# Patient Record
Sex: FEMALE | Race: WHITE | NOT HISPANIC OR LATINO | Employment: FULL TIME | URBAN - METROPOLITAN AREA
[De-identification: names, ages, dates, MRNs, and addresses within clinical notes are randomized per-mention and may not be internally consistent; named-entity substitution may affect disease eponyms.]

---

## 2017-06-20 ENCOUNTER — GENERIC CONVERSION - ENCOUNTER (OUTPATIENT)
Dept: OTHER | Facility: OTHER | Age: 37
End: 2017-06-20

## 2017-10-19 ENCOUNTER — GENERIC CONVERSION - ENCOUNTER (OUTPATIENT)
Dept: OTHER | Facility: OTHER | Age: 37
End: 2017-10-19

## 2017-10-27 ENCOUNTER — GENERIC CONVERSION - ENCOUNTER (OUTPATIENT)
Dept: OTHER | Facility: OTHER | Age: 37
End: 2017-10-27

## 2017-10-27 ENCOUNTER — ALLSCRIPTS OFFICE VISIT (OUTPATIENT)
Dept: PERINATAL CARE | Facility: MEDICAL CENTER | Age: 37
End: 2017-10-27
Payer: COMMERCIAL

## 2017-10-27 PROCEDURE — 76801 OB US < 14 WKS SINGLE FETUS: CPT | Performed by: OBSTETRICS & GYNECOLOGY

## 2017-11-15 ENCOUNTER — GENERIC CONVERSION - ENCOUNTER (OUTPATIENT)
Dept: OTHER | Facility: OTHER | Age: 37
End: 2017-11-15

## 2017-12-29 ENCOUNTER — ALLSCRIPTS OFFICE VISIT (OUTPATIENT)
Dept: PERINATAL CARE | Facility: MEDICAL CENTER | Age: 37
End: 2017-12-29
Payer: COMMERCIAL

## 2017-12-29 ENCOUNTER — GENERIC CONVERSION - ENCOUNTER (OUTPATIENT)
Dept: OTHER | Facility: OTHER | Age: 37
End: 2017-12-29

## 2017-12-29 PROCEDURE — 76811 OB US DETAILED SNGL FETUS: CPT | Performed by: OBSTETRICS & GYNECOLOGY

## 2017-12-29 PROCEDURE — 76817 TRANSVAGINAL US OBSTETRIC: CPT | Performed by: OBSTETRICS & GYNECOLOGY

## 2018-01-13 VITALS
WEIGHT: 137.01 LBS | BODY MASS INDEX: 25.21 KG/M2 | HEIGHT: 62 IN | SYSTOLIC BLOOD PRESSURE: 112 MMHG | DIASTOLIC BLOOD PRESSURE: 70 MMHG

## 2018-01-17 NOTE — PROGRESS NOTES
OCT 27 2017         RE: Natalie Rai                                 To: [facility]   MR#: [id number]                                  [facility]   : [facility], [address] [place] [address]   ENC: [id number]                             Fax: [phone]   (Exam #: [id number])      The LMP of this 40year old,  G2, P1-0-0-1 patient was AUG 12 2017, giving   her an BAR of MAY 19 2018 and a current gestational age of 9 weeks 6 days   by dates  A sonographic examination was performed on OCT 27 2017 using   real time equipment  The ultrasound examination was performed using   abdominal technique  The patient has a BMI of 25 1  Her blood pressure   today was 112/70  Earliest US on record:   [facility] US 10/27/17  10w6d 18  BAR      Mykel Call is on prenatal vitamins, Valtrex 500 mg daily Lotemax eyedrops 2   times a day and reports an allergy to penicillin  She denies any use of   cigarettes, alcohol or illicit drugs  Her past mental history is   significant for 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 currently treated with this   Lotemax and Valtrex  She also reports a history of asthma but only as a   child  She denies any prior surgical history  Her OB history includes a   prior 7 lbs  8 oz  baby born vaginally in  without complications  She   denies any first generation family history of hypertension, diabetes,   thrombosis or congenital anomalies  She is advanced maternal age in this   pregnancy and reports she is getting the pluriSelectn screen completed  She is   here today for a dating scan  Multiple longitudinal and transverse sections revealed a noyola   intrauterine pregnancy with the fetus in variable presentation  The   placenta is anterior in implantation, grade 0 in appearance        Cardiac motion was observed at 169 bpm       INDICATIONS      confirm gestational age   advanced maternal age      Exam Types      Level I      RESULTS      Fetus # 1 of 1 Variable presentation   Fetal growth appeared normal      MEASUREMENTS (* Included In Average GA)      CRL              4 2 cm        10 weeks 6 days*      THE AVERAGE GESTATIONAL AGE is 10 weeks 6 days +/- 7 days  ANATOMY COMMENTS      Anatomic detail is extremely limited at this gestational age  A discrete   fetal pole with cardiac motion was documented  Limb buds were documented   as well  The gestational sac is normal in appearance and located in the   fundus of the uterus  No gross abnormalities were noted on this   examination  Free fluid is not seen in the posterior cul-de-sac  There is   no suspicion of a subchorionic hematoma  ADNEXA      The left ovary appeared normal and measured 3 7 x 2 1 x 1 5 cm with a   volume of 6 1 cc  The right ovary appeared normal and measured 2 8 x 1 2 x   1 1 cm with a volume of 1 9 cc       AMNIOTIC FLUID         Largest Vertical Pocket = 4 0 cm   Amniotic Fluid: Normal      IMPRESSION      Ortiz IUP   10 weeks and 6 days by this ultrasound  (BAR=MAY 19 2018)   Variable presentation   Fetal growth appeared normal   Regular fetal heart rate of 169 bpm   Anterior placenta      [place]   Dear  Yell Neither      Thank you for requesting a  consultation on your patient Ms Ferguson for the following indications: AMA- Advanced Maternal Age  I spoke   to Nikolay Curry on the phone for her convenience  Risks:   1  AMA- with a 1 2 % risk for any type of aneuploidy at the time of an   amniocentesis  She understands that a Sequential Screen may miss 10% of   the babies with Downs syndrome or Trisomy 25 and that it is not a great   screen for other chromosome problems which her age puts her at risk for  Ultrasound is also not a good screen for aneuploidy as it may miss up to   50% of the babies with a chromosome problem   High risk patients such as   AMA patients, those with family hx of aneuploidy, an abnormal result on   the other genetic screens or US evidence for malformations have other   options such as invasive genetic screening on the placenta/amniotic fluid   or Cell free DNA screening completed on maternal blood  Cell free DNA   screening picks up 99% of babies with Downs and 97% of babies with T18  The false positive risk for Downs and T18 if found is 0 1%  This test also   can  7 of 11 babies with T13 with a false positive rate of 0 2%  This noninvasive test though does not screen for all types of aneuploidy   and needs to be confirmed by invasive genetic testing  Invasive genetic   testing includes an amniocentesis or CVS and yields the most information   on all of the chromosomes  Risks and benefits of both invasive procedures   were reviewed including an increased risk of loss of 0 5% over the   baseline risk  It was explained that normal chromosomes and a normal   ultrasound do not guarantee a normal baby nor a normal pregnancy outcome  Patient verbalized understanding and wanted to proceed with Cell free DNA   testing done locally  The patient was informed of the findings and counseled about the   limitations of the exam in detecting all forms of fetal congenital   abnormalities  She denies any vaginal bleeding or uterine cramping/contractions  Follow up recommended:   1  Fetal Level II ultrasound imaging is recommended at 19-20 weeks'   gestation  2  Recommend a 32 week growth scan due to the patients risk factor of   being advanced maternal age  3  I recommend a follow-up MSAFP at 16-18 weeks as a Cell free DNA   screening does not screen for neural tube defects  The later can be   ordered locally  4  I offered a return visit for nuchal translucency screening  She will   discuss with her provider,  and her insurance company  She was   given the diagnosis codes to make sure it's covered      CHRISSY Amador M D     Maternal-Fetal Medicine   Electronically signed 10/29/17 14:02

## 2018-01-23 VITALS
BODY MASS INDEX: 26.87 KG/M2 | DIASTOLIC BLOOD PRESSURE: 70 MMHG | HEART RATE: 86 BPM | WEIGHT: 146.04 LBS | HEIGHT: 62 IN | SYSTOLIC BLOOD PRESSURE: 111 MMHG

## 2018-01-23 NOTE — CONSULTS
I had the pleasure of evaluating your patient, Mk Brown  My full evaluation follows:      Chief Complaint  Here for ultrasound study      History of Present Illness  Please refer to the ultrasound report for additional information  Active Problems    1  Advanced maternal age in multigravida (80 56) (O1 46)   2  Need for Tdap vaccination (V06 1) (Z23)    Past Medical History    · History of Eye problems (V41 1) (H57 9)   · History of Herpes ocular (053 29) (B02 30)   · History of asthma (V12 69) (Z87 09)   · History of chicken pox (V12 09) (Z86 19)   · History of rectal fissure (V12 79) (Z87 19)   · History of scarlet fever (V12 09) (Z86 19)    Surgical History    · History of Cervical Loop Electrosurgical Excision (LEEP)   · History of Cervical Loop Electrosurgical Excision (LEEP)   · History of Cervix Cryosurgery   · History of Cryosurgery    Family History    · Family history of hyperlipidemia (V18 19) (Z83 49)    · Family history of cardiac disorder (V17 49) (Z82 49)   · Family history of diabetes mellitus (V18 0) (Z83 3)   · Family history of leukemia (V16 6) (Z80 6)    · Family history of asthma (V17 5) (Z82 5)   · Family history of chronic obstructive pulmonary disease (V17 6) (Z82 5)    · Family history of thyroid disease (V18 19) (Z83 49)    · Family history of hypertension (V17 49) (Z82 49)    · Family history of Colon carcinoma    · Family history of malignant neoplasm of female breast (V16 3) (Z80 3)    · Family history of epilepsy (V17 2) (Z82 0)    · Denied: Family history of mental disorder   · Denied: Family history of substance abuse    Social History    · Always uses seat belt   · Brushes teeth twice a day   · Dental care, regularly   · Exercise: Walking   · Full-time employment   ·    · Never a smoker   · Never a smoker   · No alcohol use   · No caffeine use   · No drug use   · Pets/Animals: Dog   · Recreation: Crafts    Current Meds   1  Fish Oil CAPS;    Therapy: (Recorded:[id number]) to Recorded   2  Lotemax 0 5 % Ophthalmic Suspension; INSTILL 1-2 DROPS INTO AFFECTED EYE(S)    4 TIMES DAILY AS DIRECTED; Therapy: [id number] to Recorded   3  Prenatal Vitamin TABS; Therapy: (Recorded:84Lln9325) to Recorded   4  Valtrex 500 MG Oral Tablet; Therapy: (Recorded:97Wsh6558) to Recorded   5  Zantac 75 75 MG Oral Tablet; Therapy: (Recorded:64Xrl8401) to Recorded    Allergies    1  Penicillins    2  No Known Environmental Allergies   3  No Known Food Allergies    Vitals   Recorded: 07XKB9049 09:53AM   Heart Rate 86   Systolic 460, LUE, Sitting   Diastolic 70, LUE, Sitting   Height 5 ft 2 in   Weight 146 lb 0 6 oz   BMI Calculated 26 71   BSA Calculated 1 67   Pain Scale 0     Results/Data  Exam description: level II obstetrical ultrasound, transvaginal obstetrical ultrasound  Findings: Please refer to the ultrasound report for additional information  Discussion/Summary    Please refer to the ultrasound report for additional information  The patient was counseled regarding diagnostic results, instructions for management, prognosis, impressions  Thank you very much for allowing me to participate in the care of this patient  If you have any questions, please do not hesitate to contact me        Future Appointments    Signatures   Electronically signed by : SARAH BETH Chung ; Dec 31 2017 10:47AM EST                       (Author)

## 2018-01-23 NOTE — PROGRESS NOTES
DEC 29 2017         RE: Nirali Vernon                                 To: [facility]   MR#: [id number]                                  [address]   : [facility], [address] [place] [address]   ENC: [id number]                             Fax: [phone]   (Exam #: [id number])      The LMP of this 40year old,  G2, P1-0-0-1 patient was AUG 12 2017, giving   her an BAR of MAY 19 2018 and a current gestational age of 24 weeks 6 days   by dates  A sonographic examination was performed on DEC 29 2017 using   real time equipment  The ultrasound examination was performed using   abdominal & vaginal techniques  The patient has a BMI of 26 9  Her blood   pressure today was 111/70  Earliest US on record:   [facility] US 10/27/17  10w6d 18  BAR      With the exception of an episode of lower abdominal pain yesterday, Hamilton Alvarez   has no complaints  She reports fetal movement and denies vaginal bleeding  By history, cell-free DNA analysis obtained earlier in the pregnancy   revealed negative results        Cardiac motion was observed at 135 bpm       INDICATIONS      fetal anatomical survey   advanced maternal age      Exam Types      Transvaginal   LEVEL II      RESULTS      Fetus # 1 of 1   Vertex presentation   Fetal growth appeared normal   Placenta Location = Anterior   No placenta previa   Placenta Grade = I      MEASUREMENTS (* Included In Average GA)      AC              15 3 cm        20 weeks 2 days* (63%)   BPD              4 9 cm        20 weeks 5 days* (80%)   HC              17 8 cm        20 weeks 1 day * (59%)   Femur            3 2 cm        20 weeks 1 day * (42%)      Nuchal Fold      2 3 mm   NBL              6 7 mm      Humerus          3 0 cm        20 weeks 0 days  (59%)      Cerebellum       2 1 cm        20 weeks 3 days   Biorbit          3 3 cm        21 weeks 3 days   CisternaMagna    6 3 mm      HC/AC           1 16   FL/AC           0 21 FL/BPD          0 65   EFW (Ac/Fl/Hc)   340 grams - 0 lbs 12 oz      THE AVERAGE GESTATIONAL AGE is 20 weeks 2 days +/- 10 days  AMNIOTIC FLUID         Largest Vertical Pocket = 5 8 cm   Amniotic Fluid: Normal      CERVICAL EVALUATION      The cervix appeared normal (Ultrasound Examination)  SUPINE      Cervical Length: 2 70 cm      OTHER TEST RESULTS           Funneling?: No             Dynamic Changes?: No        Resp  To TFP?: No                      Debris?: No      ANATOMY      Head                                    Normal   Face/Neck                               Normal   Th  Cav  Normal   Heart                                   See Details   Abd  Cav  Normal   Stomach                                 Normal   Right Kidney                            Normal   Left Kidney                             Normal   Bladder                                 Normal   Abd  Wall                               Normal   Spine                                   Normal   Extrems                                 Normal   Genitalia                               Normal   Placenta                                Normal   Umbl  Cord                              Normal   Uterus                                  Normal   PCI                                     Normal      ANATOMY DETAILS      Visualized Appearing Sonographically Normal:   HEAD: (Calvarium, BPD Level, Cavum, Lateral Ventricles, Choroid Plexus,   Cerebellum, Cisterna Magna);    FACE/NECK: (Neck, Nuchal Fold, Profile,   Orbits, Nose/Lips, Palate, Face);    TH  CAV  : (Lungs, Diaphragm); HEART: (Four Chamber View, Proximal Left Outflow, Proximal Right Outflow,   3VV, 3 Vessel Trachea, Short Axis of Greater Vessels, Ductal Arch, Aortic   Arch, IVC, SVC, Cardiac Axis, Cardiac Position);    ABD  CAV : (Liver);      STOMACH, RIGHT KIDNEY, LEFT KIDNEY, BLADDER, ABD   WALL, SPINE: (Cervical   Spine, Thoracic Spine, Lumbar Spine, Sacrum);    EXTREMS: (Lt Humerus, Rt   Humerus, Lt Forearm, Rt Forearm, Lt Hand, Rt Hand, Lt Femur, Rt Femur, Lt   Low Leg, Rt Low Leg, Lt Foot, Rt Foot);    GENITALIA (Male), PLACENTA,   UMBL  CORD, UTERUS, PCI      Suboptimally Visualized:   HEART: (Interventricular Septum, Interatrial Septum)      ADNEXA      The left ovary appeared normal and measured 2 8 x 2 6 x 1 1 cm with a   volume of 4 2 cc  The right ovary appeared normal and measured 1 8 x 2 3 x   1 0 cm with a volume of 2 2 cc  IMPRESSION      Ortiz IUP   20 weeks and 2 days by this ultrasound  (BAR=MAY 16 2018)   Vertex presentation   Fetal growth appeared normal   Regular fetal heart rate of 135 bpm   Anterior placenta   No placenta previa      GENERAL COMMENT      No fetal structural abnormality or ultrasound marker for aneuploidy is   identified on the Level II ultrasound study today  The cardiac septal view   is suboptimally imaged secondary to the constraints related to unfavorable   fetal position  Fetal growth and amniotic fluid volume are normal   The   placenta is normal in appearance  The cervix is normal in appearance by transvaginal sonography  The   cervical length is normal   Cervical debris is not present  Cervical   funneling is not present  Neither provocative nor dynamic change is   appreciated  Today's ultrasound findings and suggested follow-up were discussed in   detail with Brad Moore  We discussed that prenatal ultrasound cannot rule out   all congenital abnormalities  Salinasmanuel Moore will return to the [facility]  at   32 weeks gestation to assess fetal interval growth for the indication of   advanced maternal age and an associated increased risk for fetal growth   abnormalities  The face to face time, in addition to time spent discussing ultrasound   results, was approximately 10 minutes, greater than 50% of which was spent   during counseling and coordination of care        Federica Walsh CHRISSY Stewart , CHRISSY YANEZ S  SARAH BETH Ricardo     Maternal-Fetal Medicine   Electronically signed 12/31/17 10:53

## 2018-03-23 ENCOUNTER — ULTRASOUND (OUTPATIENT)
Dept: PERINATAL CARE | Facility: MEDICAL CENTER | Age: 38
End: 2018-03-23
Payer: COMMERCIAL

## 2018-03-23 ENCOUNTER — OFFICE VISIT (OUTPATIENT)
Dept: INTERNAL MEDICINE CLINIC | Facility: CLINIC | Age: 38
End: 2018-03-23
Payer: COMMERCIAL

## 2018-03-23 VITALS
SYSTOLIC BLOOD PRESSURE: 117 MMHG | DIASTOLIC BLOOD PRESSURE: 75 MMHG | WEIGHT: 165 LBS | HEIGHT: 61 IN | HEART RATE: 99 BPM | BODY MASS INDEX: 31.15 KG/M2

## 2018-03-23 VITALS
HEIGHT: 61 IN | HEART RATE: 108 BPM | OXYGEN SATURATION: 97 % | SYSTOLIC BLOOD PRESSURE: 106 MMHG | BODY MASS INDEX: 31.3 KG/M2 | WEIGHT: 165.8 LBS | RESPIRATION RATE: 18 BRPM | DIASTOLIC BLOOD PRESSURE: 60 MMHG

## 2018-03-23 DIAGNOSIS — O09.523 ELDERLY MULTIGRAVIDA IN THIRD TRIMESTER: Primary | ICD-10-CM

## 2018-03-23 DIAGNOSIS — O99.213 OBESITY AFFECTING PREGNANCY IN THIRD TRIMESTER: ICD-10-CM

## 2018-03-23 DIAGNOSIS — Z36.89 ENCOUNTER FOR FETAL ANATOMIC SURVEY: ICD-10-CM

## 2018-03-23 DIAGNOSIS — Z3A.31 31 WEEKS GESTATION OF PREGNANCY: ICD-10-CM

## 2018-03-23 DIAGNOSIS — Z00.00 ANNUAL PHYSICAL EXAM: Primary | ICD-10-CM

## 2018-03-23 PROCEDURE — 76816 OB US FOLLOW-UP PER FETUS: CPT | Performed by: OBSTETRICS & GYNECOLOGY

## 2018-03-23 PROCEDURE — 99212 OFFICE O/P EST SF 10 MIN: CPT | Performed by: OBSTETRICS & GYNECOLOGY

## 2018-03-23 PROCEDURE — 99395 PREV VISIT EST AGE 18-39: CPT | Performed by: PHYSICIAN ASSISTANT

## 2018-03-23 RX ORDER — VALACYCLOVIR HYDROCHLORIDE 500 MG/1
500 TABLET, FILM COATED ORAL DAILY
COMMUNITY

## 2018-03-23 RX ORDER — RANITIDINE HCL 75 MG
TABLET ORAL
COMMUNITY
End: 2018-04-27 | Stop reason: ALTCHOICE

## 2018-03-23 RX ORDER — PNV NO.95/FERROUS FUM/FOLIC AC 28MG-0.8MG
TABLET ORAL
COMMUNITY

## 2018-03-23 RX ORDER — LOTEPREDNOL ETABONATE 5 MG/ML
1-2 SUSPENSION/ DROPS OPHTHALMIC 4 TIMES DAILY
COMMUNITY
Start: 2016-12-13 | End: 2022-04-20

## 2018-03-23 NOTE — PATIENT INSTRUCTIONS
General medical exam is good  Patient will return for Tdap immunization next week  Wellness Visit for Adults   AMBULATORY CARE:   A wellness visit  is when you see your healthcare provider to get screened for health problems  You can also get advice on how to stay healthy  Write down your questions so you remember to ask them  Ask your healthcare provider how often you should have a wellness visit  What happens at a wellness visit:  Your healthcare provider will ask about your health, and your family history of health problems  This includes high blood pressure, heart disease, and cancer  He or she will ask if you have symptoms that concern you, if you smoke, and about your mood  You may also be asked about your intake of medicines, supplements, food, and alcohol  Any of the following may be done:  · Your weight  will be checked  Your height may also be checked so your body mass index (BMI) can be calculated  Your BMI shows if you are at a healthy weight  · Your blood pressure  and heart rate will be checked  Your temperature may also be checked  · Blood and urine tests  may be done  Blood tests may be done to check your cholesterol levels  Abnormal cholesterol levels increase your risk for heart disease and stroke  You may also need a blood or urine test to check for diabetes if you are at increased risk  Urine tests may be done to look for signs of an infection or kidney disease  · A physical exam  includes checking your heartbeat and lungs with a stethoscope  Your healthcare provider may also check your skin to look for sun damage  · Screening tests  may be recommended  A screening test is done to check for diseases that may not cause symptoms  The screening tests you may need depend on your age, gender, family history, and lifestyle habits  For example, colorectal screening may be recommended if you are 48years old or older    Screening tests you need if you are a woman:   · A Pap smear  is used to screen for cervical cancer  Pap smears are usually done every 3 to 5 years depending on your age  You may need them more often if you have had abnormal Pap smear test results in the past  Ask your healthcare provider how often you should have a Pap smear  · A mammogram  is an x-ray of your breasts to screen for breast cancer  Experts recommend mammograms every 2 years starting at age 48 years  You may need a mammogram at age 52 years or younger if you have an increased risk for breast cancer  Talk to your healthcare provider about when you should start having mammograms and how often you need them  Vaccines you may need:   · Get an influenza vaccine  every year  The influenza vaccine protects you from the flu  Several types of viruses cause the flu  The viruses change over time, so new vaccines are made each year  · Get a tetanus-diphtheria (Td) booster vaccine  every 10 years  This vaccine protects you against tetanus and diphtheria  Tetanus is a severe infection that may cause painful muscle spasms and lockjaw  Diphtheria is a severe bacterial infection that causes a thick covering in the back of your mouth and throat  · Get a human papillomavirus (HPV) vaccine  if you are female and aged 23 to 32 or male 23 to 24 and never received it  This vaccine protects you from HPV infection  HPV is the most common infection spread by sexual contact  HPV may also cause vaginal, penile, and anal cancers  · Get a pneumococcal vaccine  if you are aged 72 years or older  The pneumococcal vaccine is an injection given to protect you from pneumococcal disease  Pneumococcal disease is an infection caused by pneumococcal bacteria  The infection may cause pneumonia, meningitis, or an ear infection  · Get a shingles vaccine  if you are aged 61 or older, even if you have had shingles before  The shingles vaccine is an injection to protect you from the varicella-zoster virus   This is the same virus that causes chickenpox  Shingles is a painful rash that develops in people who had chickenpox or have been exposed to the virus  How to eat healthy:  My Plate is a model for planning healthy meals  It shows the types and amounts of foods that should go on your plate  Fruits and vegetables make up about half of your plate, and grains and protein make up the other half  A serving of dairy is included on the side of your plate  The amount of calories and serving sizes you need depends on your age, gender, weight, and height  Examples of healthy foods are listed below:  · Eat a variety of vegetables  such as dark green, red, and orange vegetables  You can also include canned vegetables low in sodium (salt) and frozen vegetables without added butter or sauces  · Eat a variety of fresh fruits , canned fruit in 100% juice, frozen fruit, and dried fruit  · Include whole grains  At least half of the grains you eat should be whole grains  Examples include whole-wheat bread, wheat pasta, brown rice, and whole-grain cereals such as oatmeal     · Eat a variety of protein foods such as seafood (fish and shellfish), lean meat, and poultry without skin (turkey and chicken)  Examples of lean meats include pork leg, shoulder, or tenderloin, and beef round, sirloin, tenderloin, and extra lean ground beef  Other protein foods include eggs and egg substitutes, beans, peas, soy products, nuts, and seeds  · Choose low-fat dairy products such as skim or 1% milk or low-fat yogurt, cheese, and cottage cheese  · Limit unhealthy fats  such as butter, hard margarine, and shortening  Exercise:  Exercise at least 30 minutes per day on most days of the week  Some examples of exercise include walking, biking, dancing, and swimming  You can also fit in more physical activity by taking the stairs instead of the elevator or parking farther away from stores  Include muscle strengthening activities 2 days each week   Regular exercise provides many health benefits  It helps you manage your weight, and decreases your risk for type 2 diabetes, heart disease, stroke, and high blood pressure  Exercise can also help improve your mood  Ask your healthcare provider about the best exercise plan for you  General health and safety guidelines:   · Do not smoke  Nicotine and other chemicals in cigarettes and cigars can cause lung damage  Ask your healthcare provider for information if you currently smoke and need help to quit  E-cigarettes or smokeless tobacco still contain nicotine  Talk to your healthcare provider before you use these products  · Limit alcohol  A drink of alcohol is 12 ounces of beer, 5 ounces of wine, or 1½ ounces of liquor  · Lose weight, if needed  Being overweight increases your risk of certain health conditions  These include heart disease, high blood pressure, type 2 diabetes, and certain types of cancer  · Protect your skin  Do not sunbathe or use tanning beds  Use sunscreen with a SPF 15 or higher  Apply sunscreen at least 15 minutes before you go outside  Reapply sunscreen every 2 hours  Wear protective clothing, hats, and sunglasses when you are outside  · Drive safely  Always wear your seatbelt  Make sure everyone in your car wears a seatbelt  A seatbelt can save your life if you are in an accident  Do not use your cell phone when you are driving  This could distract you and cause an accident  Pull over if you need to make a call or send a text message  · Practice safe sex  Use latex condoms if are sexually active and have more than one partner  Your healthcare provider may recommend screening tests for sexually transmitted infections (STIs)  · Wear helmets, lifejackets, and protective gear  Always wear a helmet when you ride a bike or motorcycle, go skiing, or play sports that could cause a head injury  Wear protective equipment when you play sports   Wear a lifejacket when you are on a boat or doing water sports  © 2017 [address]  Information is for End User's use only and may not be sold, redistributed or otherwise used for commercial purposes  All illustrations and images included in CareNotes® are the copyrighted property of A D [employer] M [employer]  or [address]  The above information is an  only  It is not intended as medical advice for individual conditions or treatments  Talk to your doctor, nurse or pharmacist before following any medical regimen to see if it is safe and effective for you

## 2018-03-29 ENCOUNTER — OFFICE VISIT (OUTPATIENT)
Dept: INTERNAL MEDICINE CLINIC | Facility: CLINIC | Age: 38
End: 2018-03-29
Payer: COMMERCIAL

## 2018-03-29 DIAGNOSIS — Z23 NEED FOR DIPHTHERIA-TETANUS-PERTUSSIS (TDAP) VACCINE: Primary | ICD-10-CM

## 2018-03-29 PROCEDURE — 90715 TDAP VACCINE 7 YRS/> IM: CPT

## 2018-03-29 PROCEDURE — 90471 IMMUNIZATION ADMIN: CPT

## 2018-04-27 ENCOUNTER — OFFICE VISIT (OUTPATIENT)
Dept: INTERNAL MEDICINE CLINIC | Facility: CLINIC | Age: 38
End: 2018-04-27
Payer: COMMERCIAL

## 2018-04-27 VITALS
WEIGHT: 166.4 LBS | DIASTOLIC BLOOD PRESSURE: 86 MMHG | BODY MASS INDEX: 31.42 KG/M2 | HEIGHT: 61 IN | HEART RATE: 112 BPM | OXYGEN SATURATION: 98 % | RESPIRATION RATE: 20 BRPM | TEMPERATURE: 98.9 F | SYSTOLIC BLOOD PRESSURE: 122 MMHG

## 2018-04-27 DIAGNOSIS — H65.03 BILATERAL ACUTE SEROUS OTITIS MEDIA, RECURRENCE NOT SPECIFIED: ICD-10-CM

## 2018-04-27 DIAGNOSIS — J01.40 ACUTE NON-RECURRENT PANSINUSITIS: Primary | ICD-10-CM

## 2018-04-27 PROCEDURE — 3008F BODY MASS INDEX DOCD: CPT | Performed by: PHYSICIAN ASSISTANT

## 2018-04-27 PROCEDURE — 99213 OFFICE O/P EST LOW 20 MIN: CPT | Performed by: PHYSICIAN ASSISTANT

## 2018-04-27 RX ORDER — AZITHROMYCIN 250 MG/1
TABLET, FILM COATED ORAL
Qty: 6 TABLET | Refills: 0 | Status: SHIPPED | OUTPATIENT
Start: 2018-04-27 | End: 2018-05-01

## 2018-04-27 NOTE — PROGRESS NOTES
Assessment/Plan:     Patient Instructions   Continue Sudafed during the day, start Claritin 10 mg daily  Start finished antibiotic  Stay hydrated  Followup scheduled per orders  Diagnoses and all orders for this visit:    Acute non-recurrent pansinusitis  -     azithromycin (ZITHROMAX) 250 mg tablet; Take 2 tablets today then 1 tablet daily x 4 days          Subjective:      Patient ID: Stefan Estrada is a 41 y o  female  Acute visit    Patient started over 2 weeks ago with her eyes feeling like they were burning, and a scratchy sore throat  Shortly after she started becoming congested with head congestion, nasal congestion, postnasal drip and her ears feel blocked  She went to local urgent care where because she is pregnant they started conservative measures of Benadryl at night, Sudafed during the day, Neti pot use, humidification in the bedroom, and Flonase nasal spray  She had been doing this for over the past week and has not had any improvement in fact she feels she is worsening and is more congested and her ears feel more blocked  She has felt feverish          ALLERGIES:  Allergies   Allergen Reactions    Penicillins Rash       CURRENT MEDICATIONS:    Current Outpatient Prescriptions:     loteprednol etabonate (LOTEMAX) 0 5 % ophthalmic suspension, Apply 1-2 drops to eye 4 (four) times a day, Disp: , Rfl:     Omega-3 Fatty Acids (FISH OIL) 645 MG CAPS, Take by mouth, Disp: , Rfl:     Prenatal Vit-Fe Fumarate-FA (PRENATAL VITAMIN) 27-0 8 MG TABS, Take by mouth, Disp: , Rfl:     valACYclovir (VALTREX) 500 mg tablet, Take 500 mg by mouth daily  , Disp: , Rfl:     azithromycin (ZITHROMAX) 250 mg tablet, Take 2 tablets today then 1 tablet daily x 4 days, Disp: 6 tablet, Rfl: 0    ACTIVE PROBLEM LIST:  Patient Active Problem List   Diagnosis    Elderly multigravida in third trimester    Obesity affecting pregnancy in third trimester    Acute non-recurrent pansinusitis       PAST MEDICAL HISTORY:  Past Medical History:   Diagnosis Date    Asthma     as a child    Chicken pox     patient had it twice    Eye problems     chronic herpes of R eye    Herpes ocular     Rectal fissure     last assessed - [id number]    Scarlet fever     patient had when they were an infant       PAST SURGICAL HISTORY:  Past Surgical History:   Procedure Laterality Date    CERVICAL BIOPSY  W/ LOOP ELECTRODE EXCISION      GYNECOLOGIC CRYOSURGERY      Cervix       FAMILY HISTORY:  Family History   Problem Relation Age of Onset    Hyperlipidemia Father     Heart disease Maternal Grandmother      cardiac disorder    Diabetes Maternal Grandmother     Leukemia Maternal Grandmother     Thyroid disease Maternal Grandfather     COPD Paternal Grandmother     Asthma Paternal Grandfather     Hypertension Paternal Grandfather     Colon cancer Other     Other Other      Epilepsy    Breast cancer Maternal Aunt     GI problems Mother        SOCIAL HISTORY:  Social History     Social History    Marital status: /Civil Union     Spouse name: N/A    Number of children: N/A    Years of education: N/A     Occupational History    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[employer]      Social History Main Topics    Smoking status: Never Smoker    Smokeless tobacco: Never Used    Alcohol use No    Drug use: No    Sexual activity: Yes     Partners: Male     Other Topics Concern    Not on file     Social History Narrative    Always uses seat belt    Brushes teeth twice a day     Dental care, regularly    Exercise: Walking    Pets/Animals: Dog    No caffeine use    Recreation: Crafts           Review of Systems   Constitutional: Negative for activity change, chills, fatigue and fever  HENT: Positive for congestion, ear pain, hearing loss, postnasal drip, rhinorrhea, sinus pain, sinus pressure, sneezing and sore throat  Eyes: Positive for redness  Negative for discharge and itching  Respiratory: Positive for cough  Negative for chest tightness, shortness of breath and wheezing  Cardiovascular: Negative for chest pain, palpitations and leg swelling  Gastrointestinal: Negative for abdominal pain and diarrhea  Genitourinary: Negative for difficulty urinating  Musculoskeletal: Negative for arthralgias and myalgias  Skin: Negative for rash  Allergic/Immunologic: Negative for immunocompromised state  Neurological: Positive for headaches  Negative for dizziness, syncope, weakness and light-headedness  Hematological: Negative for adenopathy  Does not bruise/bleed easily  Psychiatric/Behavioral: Negative for dysphoric mood  The patient is not nervous/anxious  Objective:  Vitals:    04/27/18 1014   BP: 122/86   Patient Position: Sitting   Cuff Size: Adult   Pulse: (!) 112   Resp: 20   Temp: 98 9 °F (37 2 °C)   TempSrc: Oral   SpO2: 98%   Weight: 75 5 kg (166 lb 6 4 oz)   Height: 5' 1" (1 549 m)        Physical Exam   Constitutional: She is oriented to person, place, and time  She appears well-developed and well-nourished  No distress  Sounds very congested   HENT:   He has a mucosa is hyperemic with increased yellow mucoid drainage, injected hyperemic posterior pharynx with increased yellow mucoid drainage, injected palpebral conjunctiva, tender frontal maxillary sinuses, tympanic membranes are dull with fluid behind   Neck: Neck supple  Cardiovascular: Normal rate, regular rhythm and normal heart sounds  Pulmonary/Chest: Effort normal and breath sounds normal    Musculoskeletal: She exhibits no edema  Lymphadenopathy:     She has no cervical adenopathy  Neurological: She is alert and oriented to person, place, and time  Skin: Skin is warm and dry  No rash noted  Psychiatric: She has a normal mood and affect  Her behavior is normal    Nursing note and vitals reviewed  RESULTS:    No results found for this or any previous visit (from the past 1008 hour(s))      This note was created with voice recognition software  Phonic, grammatical and spelling errors may be present within the note as a result

## 2018-04-27 NOTE — PATIENT INSTRUCTIONS
Continue Sudafed during the day, start Claritin 10 mg daily  Start finished antibiotic  Stay hydrated

## 2018-10-18 ENCOUNTER — OFFICE VISIT (OUTPATIENT)
Dept: INTERNAL MEDICINE CLINIC | Facility: CLINIC | Age: 38
End: 2018-10-18
Payer: COMMERCIAL

## 2018-10-18 VITALS
HEIGHT: 61 IN | SYSTOLIC BLOOD PRESSURE: 110 MMHG | RESPIRATION RATE: 18 BRPM | WEIGHT: 143 LBS | BODY MASS INDEX: 27 KG/M2 | DIASTOLIC BLOOD PRESSURE: 64 MMHG | OXYGEN SATURATION: 98 % | HEART RATE: 64 BPM

## 2018-10-18 DIAGNOSIS — Z23 NEED FOR VACCINATION: ICD-10-CM

## 2018-10-18 PROBLEM — J01.40 ACUTE NON-RECURRENT PANSINUSITIS: Status: RESOLVED | Noted: 2018-04-27 | Resolved: 2018-10-18

## 2018-10-18 PROBLEM — H65.03 BILATERAL ACUTE SEROUS OTITIS MEDIA: Status: RESOLVED | Noted: 2018-04-27 | Resolved: 2018-10-18

## 2018-10-18 PROBLEM — O09.523 ELDERLY MULTIGRAVIDA IN THIRD TRIMESTER: Status: RESOLVED | Noted: 2018-03-23 | Resolved: 2018-10-18

## 2018-10-18 PROBLEM — O99.213 OBESITY AFFECTING PREGNANCY IN THIRD TRIMESTER: Status: RESOLVED | Noted: 2018-03-23 | Resolved: 2018-10-18

## 2018-10-18 PROCEDURE — 90471 IMMUNIZATION ADMIN: CPT

## 2018-10-18 PROCEDURE — 99213 OFFICE O/P EST LOW 20 MIN: CPT | Performed by: PHYSICIAN ASSISTANT

## 2018-10-18 PROCEDURE — 90686 IIV4 VACC NO PRSV 0.5 ML IM: CPT

## 2018-10-18 RX ORDER — GANCICLOVIR 1.5 MG/G
GEL OPHTHALMIC
COMMUNITY
Start: 2018-08-14 | End: 2021-09-14 | Stop reason: ALTCHOICE

## 2018-10-18 NOTE — PROGRESS NOTES
Assessment/Plan:   Patient Instructions   Will start  Sertraline 50 mg 1/2 tablet daily for 7 days, then increase to 1 tablet daily  Will schedule follow-up in 4 weeks or sooner if needed  Regular exercise is recommended  Return in about 4 weeks (around 11/15/2018) for Recheck  Diagnoses and all orders for this visit:    Post partum depression  -     sertraline (ZOLOFT) 50 mg tablet; Take 0 5 tablets (25 mg total) by mouth daily For 7 days then take one tablet daily    Need for vaccination  -     influenza vaccine, 4071-9935, quadrivalent, 0 5 mL, preservative-free (SYRINGE, SINGLE-DOSE VIAL), for adult and pediatric patients 3 yr+ (AFLURIA, FLUARIX, FLULAVAL, FLUZONE)    Other orders  -     ZIRGAN 0 15 % GEL; Subjective:      Patient ID: Eliseo Villafuerte is a 41 y o  female  Acute visit    Patient is approximately 8 weeks postpartum  She has found that returning to work she is experiencing some anxiety symptoms with chest pressure, palpitations, shortness of breath feeling, tingling around her lips and fingers  These generally are short-lived angle way  However she is also experiencing difficulty concentrating, difficulty carrying out tasks, appetite and sleep disturbance, and no interest in doing things he used to like to do  She is not exercising  She notes that at times it is difficult to even take care of the children  She denies any suicidal thoughts  No thoughts of wanting to hurt the children          ALLERGIES:  Allergies   Allergen Reactions    Penicillins Rash       CURRENT MEDICATIONS:    Current Outpatient Prescriptions:     loteprednol etabonate (LOTEMAX) 0 5 % ophthalmic suspension, Apply 1-2 drops to eye 4 (four) times a day, Disp: , Rfl:     Omega-3 Fatty Acids (FISH OIL) 645 MG CAPS, Take by mouth, Disp: , Rfl:     Prenatal Vit-Fe Fumarate-FA (PRENATAL VITAMIN) 27-0 8 MG TABS, Take by mouth, Disp: , Rfl:     valACYclovir (VALTREX) 500 mg tablet, Take 500 mg by mouth daily  , Disp: , Rfl:     ZIRGAN 0 15 % GEL, , Disp: , Rfl:     sertraline (ZOLOFT) 50 mg tablet, Take 0 5 tablets (25 mg total) by mouth daily For 7 days then take one tablet daily, Disp: 30 tablet, Rfl: 1    ACTIVE PROBLEM LIST:  Patient Active Problem List   Diagnosis    Post partum depression       PAST MEDICAL HISTORY:  Past Medical History:   Diagnosis Date    Asthma     as a child    Chicken pox     patient had it twice    Eye problems     chronic herpes of R eye    Herpes ocular     Rectal fissure     last assessed - [id number]    Scarlet fever     patient had when they were an infant       PAST SURGICAL HISTORY:  Past Surgical History:   Procedure Laterality Date    CERVICAL BIOPSY  W/ LOOP ELECTRODE EXCISION      GYNECOLOGIC CRYOSURGERY      Cervix       FAMILY HISTORY:  Family History   Problem Relation Age of Onset    Hyperlipidemia Father     Heart disease Maternal Grandmother         cardiac disorder    Diabetes Maternal Grandmother     Leukemia Maternal Grandmother     Thyroid disease Maternal Grandfather     COPD Paternal Grandmother     Asthma Paternal Grandfather     Hypertension Paternal Grandfather     Colon cancer Other     Other Other         Epilepsy    Breast cancer Maternal Aunt     GI problems Mother        SOCIAL HISTORY:  Social History     Social History    Marital status: /Civil Union     Spouse name: N/A    Number of children: N/A    Years of education: N/A     Occupational History    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[employer]      Social History Main Topics    Smoking status: Never Smoker    Smokeless tobacco: Never Used    Alcohol use No    Drug use: No    Sexual activity: Yes     Partners: Male     Other Topics Concern    Not on file     Social History Narrative    Always uses seat belt    Brushes teeth twice a day     Dental care, regularly    Exercise: Walking    Pets/Animals: Dog    No caffeine use    Recreation: Crafts           Review of Systems   Constitutional: Positive for fatigue  Negative for activity change, chills and fever  HENT: Negative for congestion  Eyes: Negative for discharge  Respiratory: Negative for cough, chest tightness and shortness of breath  Cardiovascular: Negative for chest pain, palpitations and leg swelling  Gastrointestinal: Negative for abdominal pain  Genitourinary: Negative for difficulty urinating  Musculoskeletal: Negative for arthralgias and myalgias  Skin: Negative for rash  Allergic/Immunologic: Negative for immunocompromised state  Neurological: Positive for weakness  Negative for dizziness, syncope, light-headedness and headaches  Hematological: Negative for adenopathy  Does not bruise/bleed easily  Psychiatric/Behavioral: Positive for confusion, decreased concentration, dysphoric mood and sleep disturbance  Negative for self-injury and suicidal ideas  The patient is nervous/anxious  Objective:  Vitals:    10/18/18 1502   BP: 110/64   BP Location: Left arm   Patient Position: Sitting   Cuff Size: Adult   Pulse: 64   Resp: 18   SpO2: 98%   Weight: 64 9 kg (143 lb)   Height: 5' 1" (1 549 m)        Physical Exam   Constitutional: She is oriented to person, place, and time  She appears well-developed and well-nourished  No distress  Tearful at times when discussing her current situation  Neck: Neck supple  Cardiovascular: Normal rate, regular rhythm and normal heart sounds  Pulmonary/Chest: Effort normal and breath sounds normal  No respiratory distress  Abdominal: There is no tenderness  Musculoskeletal: She exhibits no edema  Lymphadenopathy:     She has no cervical adenopathy  Neurological: She is alert and oriented to person, place, and time  Skin: Skin is warm and dry  No rash noted  Psychiatric: She has a normal mood and affect  Her behavior is normal    Patient's affect was appropriate today as was her behavior    She was tearful at times when discussing how she was feeling, and how it was difficult to carry it daily tasks and go to work  Nursing note and vitals reviewed  RESULTS:    No results found for this or any previous visit (from the past 1008 hour(s))  This note was created with voice recognition software  Phonic, grammatical and spelling errors may be present within the note as a result

## 2018-11-15 ENCOUNTER — OFFICE VISIT (OUTPATIENT)
Dept: INTERNAL MEDICINE CLINIC | Facility: CLINIC | Age: 38
End: 2018-11-15
Payer: COMMERCIAL

## 2018-11-15 VITALS
HEIGHT: 61 IN | SYSTOLIC BLOOD PRESSURE: 120 MMHG | HEART RATE: 88 BPM | DIASTOLIC BLOOD PRESSURE: 68 MMHG | WEIGHT: 139.4 LBS | OXYGEN SATURATION: 98 % | BODY MASS INDEX: 26.32 KG/M2

## 2018-11-15 DIAGNOSIS — R00.2 PALPITATIONS: ICD-10-CM

## 2018-11-15 PROCEDURE — 93000 ELECTROCARDIOGRAM COMPLETE: CPT | Performed by: PHYSICIAN ASSISTANT

## 2018-11-15 PROCEDURE — 3008F BODY MASS INDEX DOCD: CPT | Performed by: PHYSICIAN ASSISTANT

## 2018-11-15 PROCEDURE — 99213 OFFICE O/P EST LOW 20 MIN: CPT | Performed by: PHYSICIAN ASSISTANT

## 2018-11-15 NOTE — PATIENT INSTRUCTIONS
Patient will increase her sertraline to 100 mg daily  She will get me a status call in 2 weeks to inform me of its effectiveness  No change in any other medications  I have asked her to obtain OTC Lamisil cream and use on the cracked area on the corner of her mouth  Apply twice daily, rub in well until area is healed  Schedule follow-up 1 month

## 2018-11-15 NOTE — PROGRESS NOTES
Assessment/Plan:   Patient Instructions   Patient will increase her sertraline to 100 mg daily  She will get me a status call in 2 weeks to inform me of its effectiveness  No change in any other medications  I have asked her to obtain OTC Lamisil cream and use on the cracked area on the corner of her mouth  Apply twice daily, rub in well until area is healed  Schedule follow-up 1 month  No Follow-up on file  Diagnoses and all orders for this visit:    Post partum depression    Palpitations  -     POCT ECG          Subjective:      Patient ID: Robby Blair is a 41 y o  female  Follow-up    Postpartum depression:  Patient feels she is doing a little better on the sertraline 50 mg daily  She reports stresses still elevated especially at work  She does continue to have intermittent palpitations not associated with chest pain or shortness of breath or diaphoresis  Concentration has improved  Less emotionally labile  Able to take care of her family better  Sleeping adequately  Appetite stable          ALLERGIES:  Allergies   Allergen Reactions    Penicillins Rash       CURRENT MEDICATIONS:    Current Outpatient Prescriptions:     loteprednol etabonate (LOTEMAX) 0 5 % ophthalmic suspension, Apply 1-2 drops to eye 4 (four) times a day, Disp: , Rfl:     Omega-3 Fatty Acids (FISH OIL) 645 MG CAPS, Take by mouth, Disp: , Rfl:     Prenatal Vit-Fe Fumarate-FA (PRENATAL VITAMIN) 27-0 8 MG TABS, Take by mouth, Disp: , Rfl:     sertraline (ZOLOFT) 50 mg tablet, Take 0 5 tablets (25 mg total) by mouth daily For 7 days then take one tablet daily, Disp: 30 tablet, Rfl: 1    valACYclovir (VALTREX) 500 mg tablet, Take 500 mg by mouth daily  , Disp: , Rfl:     ZIRGAN 0 15 % GEL, , Disp: , Rfl:     ACTIVE PROBLEM LIST:  Patient Active Problem List   Diagnosis    Post partum depression    Palpitations       PAST MEDICAL HISTORY:  Past Medical History:   Diagnosis Date    Asthma     as a child  Chicken pox     patient had it twice    Eye problems     chronic herpes of R eye    Herpes ocular     Rectal fissure     last assessed - [id number]    Scarlet fever     patient had when they were an infant       PAST SURGICAL HISTORY:  Past Surgical History:   Procedure Laterality Date    CERVICAL BIOPSY  W/ LOOP ELECTRODE EXCISION      GYNECOLOGIC CRYOSURGERY      Cervix       FAMILY HISTORY:  Family History   Problem Relation Age of Onset    Hyperlipidemia Father     Heart disease Maternal Grandmother         cardiac disorder    Diabetes Maternal Grandmother     Leukemia Maternal Grandmother     Thyroid disease Maternal Grandfather     COPD Paternal Grandmother     Asthma Paternal Grandfather     Hypertension Paternal Grandfather     Colon cancer Other     Other Other         Epilepsy    Breast cancer Maternal Aunt     GI problems Mother        SOCIAL HISTORY:  Social History     Social History    Marital status: /Civil Union     Spouse name: N/A    Number of children: N/A    Years of education: N/A     Occupational History    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[employer]      Social History Main Topics    Smoking status: Never Smoker    Smokeless tobacco: Never Used    Alcohol use No    Drug use: No    Sexual activity: Yes     Partners: Male     Other Topics Concern    Not on file     Social History Narrative    Always uses seat belt    Brushes teeth twice a day     Dental care, regularly    Exercise: Walking    Pets/Animals: Dog    No caffeine use    Recreation: Crafts           Review of Systems   Constitutional: Negative for activity change, chills, fatigue and fever  HENT: Negative for congestion  Eyes: Negative for discharge  Respiratory: Negative for cough, chest tightness and shortness of breath  Cardiovascular: Positive for palpitations  Negative for chest pain and leg swelling  Gastrointestinal: Negative for abdominal pain     Genitourinary: Negative for difficulty urinating  Musculoskeletal: Negative for arthralgias and myalgias  Skin: Negative for rash  Allergic/Immunologic: Negative for immunocompromised state  Neurological: Negative for dizziness, syncope, weakness, light-headedness and headaches  Hematological: Negative for adenopathy  Does not bruise/bleed easily  Psychiatric/Behavioral: Negative for confusion, decreased concentration, dysphoric mood and suicidal ideas  The patient is not nervous/anxious  Objective:  Vitals:    11/15/18 0927   BP: 120/68   BP Location: Left arm   Patient Position: Sitting   Pulse: 88   SpO2: 98%   Weight: 63 2 kg (139 lb 6 4 oz)   Height: 5' 1" (1 549 m)        Physical Exam   Constitutional: She is oriented to person, place, and time  She appears well-developed and well-nourished  No distress  Neck: Neck supple  No JVD present  Carotid bruit is not present  No thyromegaly present  Cardiovascular: Normal rate, regular rhythm and normal heart sounds  Occasional extrasystoles are present  Pulmonary/Chest: Effort normal and breath sounds normal    Musculoskeletal: She exhibits no edema  Lymphadenopathy:     She has no cervical adenopathy  Neurological: She is alert and oriented to person, place, and time  Skin: Skin is warm and dry  No rash noted  Psychiatric: She has a normal mood and affect  Her behavior is normal    Nursing note and vitals reviewed  RESULTS:    No results found for this or any previous visit (from the past 1008 hour(s))  This note was created with voice recognition software  Phonic, grammatical and spelling errors may be present within the note as a result

## 2018-12-03 ENCOUNTER — TELEPHONE (OUTPATIENT)
Dept: INTERNAL MEDICINE CLINIC | Facility: CLINIC | Age: 38
End: 2018-12-03

## 2018-12-03 RX ORDER — SERTRALINE HYDROCHLORIDE 100 MG/1
100 TABLET, FILM COATED ORAL DAILY
Qty: 30 TABLET | Refills: 5 | Status: SHIPPED | OUTPATIENT
Start: 2018-12-03 | End: 2019-04-01 | Stop reason: SDUPTHER

## 2018-12-03 NOTE — TELEPHONE ENCOUNTER
Patient was to call back and let you know how she was doing    FYI: Patient said that she feels ok but said her legs are shaking and once in a while heart feels like a flutter  Patient is going to run out of her Sertraline due to doubling up like you told her to do      Please send script to:[facility]

## 2018-12-03 NOTE — TELEPHONE ENCOUNTER
Please let patient know that I did send a new prescription in to her pharmacy  This is for the 100 mg dose  Asked her to give a status call without she is doing in 2 weeks

## 2018-12-14 ENCOUNTER — OFFICE VISIT (OUTPATIENT)
Dept: INTERNAL MEDICINE CLINIC | Facility: CLINIC | Age: 38
End: 2018-12-14
Payer: COMMERCIAL

## 2018-12-14 VITALS
HEART RATE: 82 BPM | HEIGHT: 61 IN | DIASTOLIC BLOOD PRESSURE: 78 MMHG | RESPIRATION RATE: 16 BRPM | WEIGHT: 139.8 LBS | SYSTOLIC BLOOD PRESSURE: 118 MMHG | BODY MASS INDEX: 26.39 KG/M2 | TEMPERATURE: 98.1 F | OXYGEN SATURATION: 97 %

## 2018-12-14 PROCEDURE — 99213 OFFICE O/P EST LOW 20 MIN: CPT | Performed by: PHYSICIAN ASSISTANT

## 2018-12-14 PROCEDURE — 3008F BODY MASS INDEX DOCD: CPT | Performed by: PHYSICIAN ASSISTANT

## 2018-12-14 NOTE — PATIENT INSTRUCTIONS
Will continue current medication at this time  If leg shaking continues and becomes bothersome, make follow-up sooner than 4 months

## 2018-12-14 NOTE — PROGRESS NOTES
Assessment/Plan:   Patient Instructions   Will continue current medication at this time  If leg shaking continues and becomes bothersome, make follow-up sooner than 4 months  Recommended gyn follow up  Discussed therapy again  Pt not interested  Return in about 4 months (around 4/14/2019) for Next scheduled follow up  Diagnoses and all orders for this visit:    Post partum depression    Other orders  -     RESTASIS MULTIDOSE 0 05 % ophthalmic emulsion;           Subjective:      Patient ID: Eliseo Villafuerte is a 41 y o  female  Follow-up postpartum depression:  Patient feels the sertraline has helped her mentally to feel much better  She describes herself as having been a leg shaker all her life, but notes that since being on the sertraline she shakes her legs more prominently  While sitting on the examining table there is no shaking  When sitting on a chair with her legs on the floor she can control the shaking, however if distracted she does start this shaking  Not convinced this is a true extra pyramidal side effect  However I have instructed the patient that if this continues she is to let me know and we can change the antidepressant  We have chosen this 1 due to her currently breast feeding  She states she is thinking of stopping breast feeding soon  She denies any other concerns  Rarely has an occasional palpitation          ALLERGIES:  Allergies   Allergen Reactions    Penicillins Rash       CURRENT MEDICATIONS:    Current Outpatient Prescriptions:     loteprednol etabonate (LOTEMAX) 0 5 % ophthalmic suspension, Apply 1-2 drops to eye 4 (four) times a day, Disp: , Rfl:     Omega-3 Fatty Acids (FISH OIL) 645 MG CAPS, Take by mouth, Disp: , Rfl:     Prenatal Vit-Fe Fumarate-FA (PRENATAL VITAMIN) 27-0 8 MG TABS, Take by mouth, Disp: , Rfl:     RESTASIS MULTIDOSE 0 05 % ophthalmic emulsion, , Disp: , Rfl:     sertraline (ZOLOFT) 100 mg tablet, Take 1 tablet (100 mg total) by mouth daily For 7 days then take one tablet daily, Disp: 30 tablet, Rfl: 5    valACYclovir (VALTREX) 500 mg tablet, Take 500 mg by mouth daily  , Disp: , Rfl:     ZIRGAN 0 15 % GEL, , Disp: , Rfl:     ACTIVE PROBLEM LIST:  Patient Active Problem List   Diagnosis    Post partum depression    Palpitations       PAST MEDICAL HISTORY:  Past Medical History:   Diagnosis Date    Asthma     as a child    Chicken pox     patient had it twice    Eye problems     chronic herpes of R eye    Herpes ocular     Rectal fissure     last assessed - [id number]    Scarlet fever     patient had when they were an infant       PAST SURGICAL HISTORY:  Past Surgical History:   Procedure Laterality Date    CERVICAL BIOPSY  W/ LOOP ELECTRODE EXCISION      GYNECOLOGIC CRYOSURGERY      Cervix       FAMILY HISTORY:  Family History   Problem Relation Age of Onset    Hyperlipidemia Father     Heart disease Maternal Grandmother         cardiac disorder    Diabetes Maternal Grandmother     Leukemia Maternal Grandmother     Thyroid disease Maternal Grandfather     COPD Paternal Grandmother     Asthma Paternal Grandfather     Hypertension Paternal Grandfather     Colon cancer Other     Other Other         Epilepsy    Breast cancer Maternal Aunt     GI problems Mother        SOCIAL HISTORY:  Social History     Social History    Marital status: /Civil Union     Spouse name: N/A    Number of children: N/A    Years of education: N/A     Occupational History    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[employer]      Social History Main Topics    Smoking status: Never Smoker    Smokeless tobacco: Never Used    Alcohol use No    Drug use: No    Sexual activity: Yes     Partners: Male     Other Topics Concern    Not on file     Social History Narrative    Always uses seat belt    Brushes teeth twice a day     Dental care, regularly    Exercise: Walking    Pets/Animals: Dog    No caffeine use    Recreation: Crafts           Review of Systems   Constitutional: Negative for activity change, chills, fatigue and fever  HENT: Negative for congestion  Eyes: Negative for discharge  Respiratory: Negative for cough, chest tightness and shortness of breath  Cardiovascular: Negative for chest pain, palpitations and leg swelling  Gastrointestinal: Negative for abdominal pain  Genitourinary: Negative for difficulty urinating  Musculoskeletal: Negative for arthralgias and myalgias  Skin: Negative for rash  Allergic/Immunologic: Negative for immunocompromised state  Neurological: Negative for dizziness, syncope, weakness, light-headedness and headaches  Hematological: Negative for adenopathy  Does not bruise/bleed easily  Psychiatric/Behavioral: Negative for agitation, dysphoric mood, sleep disturbance and suicidal ideas  The patient is not nervous/anxious  Objective:  Vitals:    12/14/18 0846   BP: 118/78   BP Location: Left arm   Patient Position: Sitting   Cuff Size: Adult   Pulse: 82   Resp: 16   Temp: 98 1 °F (36 7 °C)   TempSrc: Oral   SpO2: 97%   Weight: 63 4 kg (139 lb 12 8 oz)   Height: 5' 1" (1 549 m)     Body mass index is 26 41 kg/m²  Physical Exam   Constitutional: She is oriented to person, place, and time  She appears well-developed and well-nourished  No distress  Neck: Neck supple  Cardiovascular: Normal rate, regular rhythm and normal heart sounds  Pulmonary/Chest: Effort normal and breath sounds normal    Abdominal: Soft  There is no tenderness  Lymphadenopathy:     She has no cervical adenopathy  Neurological: She is alert and oriented to person, place, and time  Skin: Skin is warm and dry  No rash noted  Psychiatric: She has a normal mood and affect  Her behavior is normal    Nursing note and vitals reviewed  RESULTS:    No results found for this or any previous visit (from the past 1008 hour(s))  This note was created with voice recognition software    Phonic, grammatical and spelling errors may be present within the note as a result

## 2019-03-25 ENCOUNTER — TELEPHONE (OUTPATIENT)
Dept: INTERNAL MEDICINE CLINIC | Facility: CLINIC | Age: 39
End: 2019-03-25

## 2019-03-25 NOTE — TELEPHONE ENCOUNTER
Patient wants to talk to you about adjusting her medications: Sertraline: patient just found out she pregnant  Please advise    Yuly Estrada

## 2019-04-01 ENCOUNTER — OFFICE VISIT (OUTPATIENT)
Dept: INTERNAL MEDICINE CLINIC | Facility: CLINIC | Age: 39
End: 2019-04-01
Payer: COMMERCIAL

## 2019-04-01 VITALS
SYSTOLIC BLOOD PRESSURE: 110 MMHG | BODY MASS INDEX: 26.06 KG/M2 | DIASTOLIC BLOOD PRESSURE: 68 MMHG | HEART RATE: 62 BPM | HEIGHT: 61 IN | OXYGEN SATURATION: 98 % | WEIGHT: 138 LBS | RESPIRATION RATE: 16 BRPM

## 2019-04-01 PROCEDURE — 3008F BODY MASS INDEX DOCD: CPT | Performed by: PHYSICIAN ASSISTANT

## 2019-04-01 PROCEDURE — 99213 OFFICE O/P EST LOW 20 MIN: CPT | Performed by: PHYSICIAN ASSISTANT

## 2019-04-01 RX ORDER — SERTRALINE HYDROCHLORIDE 25 MG/1
TABLET, FILM COATED ORAL
Qty: 10 TABLET | Refills: 0 | Status: SHIPPED | OUTPATIENT
Start: 2019-04-01 | End: 2020-02-04 | Stop reason: ALTCHOICE

## 2019-04-01 RX ORDER — SERTRALINE HYDROCHLORIDE 100 MG/1
TABLET, FILM COATED ORAL
Qty: 7 TABLET | Refills: 0
Start: 2019-04-01 | End: 2020-02-04 | Stop reason: ALTCHOICE

## 2019-04-17 ENCOUNTER — TELEPHONE (OUTPATIENT)
Dept: INTERNAL MEDICINE CLINIC | Facility: CLINIC | Age: 39
End: 2019-04-17

## 2019-05-30 ENCOUNTER — TELEPHONE (OUTPATIENT)
Dept: OTHER | Facility: OTHER | Age: 39
End: 2019-05-30

## 2019-05-31 ENCOUNTER — TELEPHONE (OUTPATIENT)
Dept: INTERNAL MEDICINE CLINIC | Facility: CLINIC | Age: 39
End: 2019-05-31

## 2019-09-16 ENCOUNTER — CLINICAL SUPPORT (OUTPATIENT)
Dept: INTERNAL MEDICINE CLINIC | Facility: CLINIC | Age: 39
End: 2019-09-16
Payer: COMMERCIAL

## 2019-09-16 DIAGNOSIS — Z23 NEED FOR INFLUENZA VACCINATION: ICD-10-CM

## 2019-09-16 DIAGNOSIS — Z23 NEED FOR TDAP VACCINATION: Primary | ICD-10-CM

## 2019-09-16 PROCEDURE — 90472 IMMUNIZATION ADMIN EACH ADD: CPT

## 2019-09-16 PROCEDURE — 90471 IMMUNIZATION ADMIN: CPT

## 2019-09-16 PROCEDURE — 90715 TDAP VACCINE 7 YRS/> IM: CPT

## 2019-09-16 PROCEDURE — 90686 IIV4 VACC NO PRSV 0.5 ML IM: CPT

## 2019-12-02 NOTE — PATIENT INSTRUCTIONS
Will start  Sertraline 50 mg 1/2 tablet daily for 7 days, then increase to 1 tablet daily  Will schedule follow-up in 4 weeks or sooner if needed  Regular exercise is recommended  no

## 2019-12-03 LAB — EXTERNAL HIV SCREEN: NORMAL

## 2020-02-04 ENCOUNTER — OFFICE VISIT (OUTPATIENT)
Dept: INTERNAL MEDICINE CLINIC | Facility: CLINIC | Age: 40
End: 2020-02-04
Payer: COMMERCIAL

## 2020-02-04 VITALS
WEIGHT: 153 LBS | BODY MASS INDEX: 28.89 KG/M2 | OXYGEN SATURATION: 97 % | SYSTOLIC BLOOD PRESSURE: 112 MMHG | DIASTOLIC BLOOD PRESSURE: 72 MMHG | HEART RATE: 83 BPM | HEIGHT: 61 IN

## 2020-02-04 DIAGNOSIS — E78.9 SERUM CHOLESTEROL ELEVATED: Primary | ICD-10-CM

## 2020-02-04 PROCEDURE — 1036F TOBACCO NON-USER: CPT | Performed by: PHYSICIAN ASSISTANT

## 2020-02-04 PROCEDURE — 99213 OFFICE O/P EST LOW 20 MIN: CPT | Performed by: PHYSICIAN ASSISTANT

## 2020-02-04 RX ORDER — ACETAMINOPHEN AND CODEINE PHOSPHATE 120; 12 MG/5ML; MG/5ML
SOLUTION ORAL
COMMUNITY
End: 2021-03-17 | Stop reason: ALTCHOICE

## 2020-02-04 NOTE — PATIENT INSTRUCTIONS
Will have patient repeat her lipid profile after finishing breastfeeding and after starting her exercise routine  I will review the results with her when they are available

## 2020-02-04 NOTE — PROGRESS NOTES
Assessment/Plan:      Quality Measures: BMI Counseling: Body mass index is 28 91 kg/m²  The BMI is above normal  Nutrition recommendations include decreasing portion sizes, encouraging healthy choices of fruits and vegetables, moderation in carbohydrate intake and reducing intake of cholesterol  Exercise recommendations include exercising 3-5 times per week  Return if symptoms worsen or fail to improve  Diagnoses and all orders for this visit:    Serum cholesterol elevated  -     Lipid panel; Future    Other orders  -     norethindrone (MICRONOR) 0 35 MG tablet; norethindrone (contraceptive) 0 35 mg tablet          Subjective:      Patient ID: Tayla Hammer is a 45 y o  female  Follow-up    Patient presents today after having lab work done at her home to obtain a life insurance policy  Initial lab work showed abnormalities in her urinalysis and elevated total cholesterol and LDL, however markedly elevated HDL cholesterol  She did have a urinalysis repeated and this turned normal   Repeated cholesterol remained high  She is however breastfeeding  We did discuss her diet, and they are several things that she can do to improve her overall diet  Her ASCVD risk score is 0 38 which we did discuss  We decided to repeat the cholesterol after she is done breastfeeding and also she has just started an exercise routine  She also modify her diet        ALLERGIES:  Allergies   Allergen Reactions    Penicillins Rash       CURRENT MEDICATIONS:    Current Outpatient Medications:     loteprednol etabonate (LOTEMAX) 0 5 % ophthalmic suspension, Apply 1-2 drops to eye 4 (four) times a day, Disp: , Rfl:     Omega-3 Fatty Acids (FISH OIL) 645 MG CAPS, Take by mouth, Disp: , Rfl:     Prenatal Vit-Fe Fumarate-FA (PRENATAL VITAMIN) 27-0 8 MG TABS, Take by mouth, Disp: , Rfl:     valACYclovir (VALTREX) 500 mg tablet, Take 500 mg by mouth daily  , Disp: , Rfl:     norethindrone (MICRONOR) 0 35 MG tablet, norethindrone (contraceptive) 0 35 mg tablet, Disp: , Rfl:     RESTASIS MULTIDOSE 0 05 % ophthalmic emulsion, , Disp: , Rfl:     ZIRGAN 0 15 % GEL, , Disp: , Rfl:     ACTIVE PROBLEM LIST:  Patient Active Problem List   Diagnosis    Post partum depression    Palpitations       PAST MEDICAL HISTORY:  Past Medical History:   Diagnosis Date    Asthma     as a child    Chicken pox     patient had it twice    Eye problems     chronic herpes of R eye    Herpes ocular     Rectal fissure     last assessed - [id number]    Scarlet fever     patient had when they were an infant       PAST SURGICAL HISTORY:  Past Surgical History:   Procedure Laterality Date    CERVICAL BIOPSY  W/ LOOP ELECTRODE EXCISION      GYNECOLOGIC CRYOSURGERY      Cervix       FAMILY HISTORY:  Family History   Problem Relation Age of Onset    Hyperlipidemia Father     Heart disease Maternal Grandmother         cardiac disorder    Diabetes Maternal Grandmother     Leukemia Maternal Grandmother     Thyroid disease Maternal Grandfather     COPD Paternal Grandmother     Asthma Paternal Grandfather     Hypertension Paternal Grandfather     Colon cancer Other     Other Other         Epilepsy    Breast cancer Maternal Aunt     GI problems Mother        SOCIAL HISTORY:  Social History     Socioeconomic History    Marital status: /Civil Union     Spouse name: Not on file    Number of children: Not on file    Years of education: Not on file    Highest education level: Not on file   Occupational History    Occupation: 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[employer]   Social Needs    Financial resource strain: Not on file    Food insecurity:     Worry: Not on file     Inability: Not on file    Transportation needs:     Medical: Not on file     Non-medical: Not on file   Tobacco Use    Smoking status: Never Smoker    Smokeless tobacco: Never Used   Substance and Sexual Activity    Alcohol use: No    Drug use: No    Sexual activity: Yes Partners: Male   Lifestyle    Physical activity:     Days per week: Not on file     Minutes per session: Not on file    Stress: Not on file   Relationships    Social connections:     Talks on phone: Not on file     Gets together: Not on file     Attends Spiritism service: Not on file     Active member of club or organization: Not on file     Attends meetings of clubs or organizations: Not on file     Relationship status: Not on file    Intimate partner violence:     Fear of current or ex partner: Not on file     Emotionally abused: Not on file     Physically abused: Not on file     Forced sexual activity: Not on file   Other Topics Concern    Not on file   Social History Narrative    Always uses seat belt    Brushes teeth twice a day     Dental care, regularly    Exercise: Walking    Pets/Animals: Dog    No caffeine use    Recreation: Crafts       Review of Systems   Constitutional: Negative for activity change, chills, fatigue and fever  HENT: Negative for congestion  Eyes: Negative for discharge  Respiratory: Negative for cough, chest tightness and shortness of breath  Cardiovascular: Negative for chest pain, palpitations and leg swelling  Gastrointestinal: Negative for abdominal pain  Genitourinary: Negative for difficulty urinating  Musculoskeletal: Negative for arthralgias and myalgias  Skin: Negative for rash  Allergic/Immunologic: Negative for immunocompromised state  Neurological: Negative for dizziness, syncope, weakness, light-headedness and headaches  Hematological: Negative for adenopathy  Does not bruise/bleed easily  Psychiatric/Behavioral: Negative for dysphoric mood and sleep disturbance  The patient is not nervous/anxious  Objective:  Vitals:    02/04/20 0840   BP: 112/72   BP Location: Left arm   Patient Position: Sitting   Cuff Size: Adult   Pulse: 83   SpO2: 97%   Weight: 69 4 kg (153 lb)   Height: 5' 1" (1 549 m)     Body mass index is 28 91 kg/m²  Physical Exam   Constitutional: She is oriented to person, place, and time  She appears well-developed and well-nourished  No distress  Cardiovascular: Normal rate, regular rhythm and normal heart sounds  Pulmonary/Chest: Effort normal and breath sounds normal    Musculoskeletal: She exhibits no edema  Neurological: She is alert and oriented to person, place, and time  Skin: Skin is warm and dry  No rash noted  Psychiatric: She has a normal mood and affect  Her behavior is normal    Nursing note and vitals reviewed  RESULTS:    No results found for this or any previous visit (from the past 1008 hour(s))  This note was created with voice recognition software  Phonic, grammatical and spelling errors may be present within the note as a result

## 2020-05-08 ENCOUNTER — TELEMEDICINE (OUTPATIENT)
Dept: INTERNAL MEDICINE CLINIC | Facility: CLINIC | Age: 40
End: 2020-05-08
Payer: COMMERCIAL

## 2020-05-08 VITALS — BODY MASS INDEX: 24.92 KG/M2 | HEIGHT: 61 IN | WEIGHT: 132 LBS

## 2020-05-08 PROCEDURE — 99213 OFFICE O/P EST LOW 20 MIN: CPT | Performed by: PHYSICIAN ASSISTANT

## 2020-05-29 ENCOUNTER — TELEPHONE (OUTPATIENT)
Dept: INTERNAL MEDICINE CLINIC | Facility: CLINIC | Age: 40
End: 2020-05-29

## 2020-06-08 ENCOUNTER — OFFICE VISIT (OUTPATIENT)
Dept: INTERNAL MEDICINE CLINIC | Facility: CLINIC | Age: 40
End: 2020-06-08
Payer: COMMERCIAL

## 2020-06-08 VITALS
TEMPERATURE: 99 F | HEIGHT: 61 IN | BODY MASS INDEX: 25.11 KG/M2 | DIASTOLIC BLOOD PRESSURE: 78 MMHG | WEIGHT: 133 LBS | SYSTOLIC BLOOD PRESSURE: 122 MMHG | HEART RATE: 84 BPM | OXYGEN SATURATION: 98 %

## 2020-06-08 PROCEDURE — 99213 OFFICE O/P EST LOW 20 MIN: CPT | Performed by: PHYSICIAN ASSISTANT

## 2020-06-08 PROCEDURE — 1036F TOBACCO NON-USER: CPT | Performed by: PHYSICIAN ASSISTANT

## 2020-06-08 RX ORDER — LORATADINE 10 MG/1
CAPSULE, LIQUID FILLED ORAL
COMMUNITY
End: 2021-03-17 | Stop reason: ALTCHOICE

## 2020-09-16 ENCOUNTER — OFFICE VISIT (OUTPATIENT)
Dept: INTERNAL MEDICINE CLINIC | Facility: CLINIC | Age: 40
End: 2020-09-16
Payer: COMMERCIAL

## 2020-09-16 VITALS
OXYGEN SATURATION: 98 % | BODY MASS INDEX: 23.22 KG/M2 | WEIGHT: 123 LBS | SYSTOLIC BLOOD PRESSURE: 120 MMHG | HEIGHT: 61 IN | HEART RATE: 97 BPM | DIASTOLIC BLOOD PRESSURE: 78 MMHG | TEMPERATURE: 98.4 F

## 2020-09-16 DIAGNOSIS — Z23 NEED FOR INFLUENZA VACCINATION: ICD-10-CM

## 2020-09-16 PROCEDURE — 90686 IIV4 VACC NO PRSV 0.5 ML IM: CPT | Performed by: PHYSICIAN ASSISTANT

## 2020-09-16 PROCEDURE — 90471 IMMUNIZATION ADMIN: CPT | Performed by: PHYSICIAN ASSISTANT

## 2020-09-16 PROCEDURE — 99213 OFFICE O/P EST LOW 20 MIN: CPT | Performed by: PHYSICIAN ASSISTANT

## 2020-09-16 RX ORDER — MULTIVITAMIN
1 CAPSULE ORAL DAILY
COMMUNITY
End: 2021-09-14 | Stop reason: ALTCHOICE

## 2020-09-16 NOTE — PATIENT INSTRUCTIONS
No change in current medication  Have cholesterol profile done when you can and we will review the results  Schedule follow-up in 6 months, sooner as needed

## 2020-09-16 NOTE — PROGRESS NOTES
Assessment/Plan:   Patient Instructions   No change in current medication  Have cholesterol profile done when you can and we will review the results  Schedule follow-up in 6 months, sooner as needed  Quality Measures:       Return in about 6 months (around 3/16/2021) for Next scheduled follow up  Diagnoses and all orders for this visit:    Post partum depression    Need for influenza vaccination  -     influenza vaccine, quadrivalent, 0 5 mL, preservative-free, for adult and pediatric patients 6 mos+ (AFLURIA, FLUARIX, FLULAVAL, FLUZONE)    Other orders  -     Multiple Vitamin (multivitamin) capsule; Take 1 capsule by mouth daily  -     CALCIUM PO; Take by mouth          Subjective:      Patient ID: Nilton Ferrari is a 45 y o  female  Follow-up    Postpartum depression:  Responded very well to sertraline  Continues on the medication and feels it is effective  At times has been thinking about weaning herself off, however 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 [place] pandemic  Doing well and tolerating that changes without apparent difficulty  Denies side effects to the medication  Continues to lose weight on weight watchers diet  Is at her goal weight at this point  Complains of a numbness sensation in her left upper chest wall for the past few days  Not aware of anything that possibly precipitated the symptoms  No cough, no fever, no chills, no palpitations or cardiac symptoms        ALLERGIES:  Allergies   Allergen Reactions    Penicillins Rash       CURRENT MEDICATIONS:    Current Outpatient Medications:     CALCIUM PO, Take by mouth, Disp: , Rfl:     Loratadine (Claritin) 10 MG CAPS, Take by mouth, Disp: , Rfl:     loteprednol etabonate (LOTEMAX) 0 5 % ophthalmic suspension, Apply 1-2 drops to eye 4 (four) times a day, Disp: , Rfl:     Multiple Vitamin (multivitamin) capsule, Take 1 capsule by mouth daily, Disp: , Rfl:    norethindrone (MICRONOR) 0 35 MG tablet, norethindrone (contraceptive) 0 35 mg tablet, Disp: , Rfl:     Omega-3 Fatty Acids (FISH OIL) 645 MG CAPS, Take by mouth, Disp: , Rfl:     RESTASIS MULTIDOSE 0 05 % ophthalmic emulsion, , Disp: , Rfl:     sertraline (ZOLOFT) 50 mg tablet, Take 1 tablet (50 mg total) by mouth daily, Disp: 30 tablet, Rfl: 5    valACYclovir (VALTREX) 500 mg tablet, Take 500 mg by mouth daily  , Disp: , Rfl:     Prenatal Vit-Fe Fumarate-FA (PRENATAL VITAMIN) 27-0 8 MG TABS, Take by mouth, Disp: , Rfl:     ZIRGAN 0 15 % GEL, , Disp: , Rfl:     ACTIVE PROBLEM LIST:  Patient Active Problem List   Diagnosis    Post partum depression    Palpitations       PAST MEDICAL HISTORY:  Past Medical History:   Diagnosis Date    Asthma     as a child    Chicken pox     patient had it twice    Eye problems     chronic herpes of R eye    Herpes ocular     Rectal fissure     last assessed - [id number]    Scarlet fever     patient had when they were an infant       PAST SURGICAL HISTORY:  Past Surgical History:   Procedure Laterality Date    CERVICAL BIOPSY  W/ LOOP ELECTRODE EXCISION      GYNECOLOGIC CRYOSURGERY      Cervix       FAMILY HISTORY:  Family History   Problem Relation Age of Onset    Hyperlipidemia Father     Heart disease Maternal Grandmother         cardiac disorder    Diabetes Maternal Grandmother     Leukemia Maternal Grandmother     Thyroid disease Maternal Grandfather     COPD Paternal Grandmother     Asthma Paternal Grandfather     Hypertension Paternal Grandfather     Colon cancer Other     Other Other         Epilepsy    Breast cancer Maternal Aunt     GI problems Mother        SOCIAL HISTORY:  Social History     Socioeconomic History    Marital status: /Civil Union     Spouse name: Not on file    Number of children: Not on file    Years of education: Not on file    Highest education level: Not on file   Occupational History    Occupation: [employer]   Social Needs    Financial resource strain: Not on file    Food insecurity     Worry: Not on file     Inability: Not on file    Transportation needs     Medical: Not on file     Non-medical: Not on file   Tobacco Use    Smoking status: Never Smoker    Smokeless tobacco: Never Used   Substance and Sexual Activity    Alcohol use: No    Drug use: No    Sexual activity: Yes     Partners: Male   Lifestyle    Physical activity     Days per week: Not on file     Minutes per session: Not on file    Stress: Not on file   Relationships    Social connections     Talks on phone: Not on file     Gets together: Not on file     Attends Caodaism service: Not on file     Active member of club or organization: Not on file     Attends meetings of clubs or organizations: Not on file     Relationship status: Not on file    Intimate partner violence     Fear of current or ex partner: Not on file     Emotionally abused: Not on file     Physically abused: Not on file     Forced sexual activity: Not on file   Other Topics Concern    Not on file   Social History Narrative    Always uses seat belt    Brushes teeth twice a day     Dental care, regularly    Exercise: Walking    Pets/Animals: Dog    No caffeine use    Recreation: Crafts       Review of Systems   Constitutional: Negative for activity change, chills, fatigue and fever  HENT: Negative for congestion  Eyes: Negative for discharge  Respiratory: Negative for cough, chest tightness and shortness of breath  Cardiovascular: Negative for chest pain, palpitations and leg swelling  Gastrointestinal: Negative for abdominal pain  Genitourinary: Negative for difficulty urinating  Musculoskeletal: Negative for arthralgias and myalgias  Skin: Negative for rash  Allergic/Immunologic: Negative for immunocompromised state  Neurological: Negative for dizziness, syncope, weakness, light-headedness and headaches     Hematological: Negative for adenopathy  Does not bruise/bleed easily  Psychiatric/Behavioral: Negative for dysphoric mood, sleep disturbance and suicidal ideas  The patient is not nervous/anxious  Objective:  Vitals:    09/16/20 1723   BP: 120/78   BP Location: Left arm   Patient Position: Sitting   Cuff Size: Adult   Pulse: 97   Temp: 98 4 °F (36 9 °C)   TempSrc: Temporal   SpO2: 98%   Weight: 55 8 kg (123 lb)   Height: 5' 1" (1 549 m)     Body mass index is 23 24 kg/m²  Physical Exam  Vitals signs and nursing note reviewed  Constitutional:       General: She is not in acute distress  Appearance: Normal appearance  She is well-developed  HENT:      Head: Normocephalic and atraumatic  Eyes:      Pupils: Pupils are equal, round, and reactive to light  Neck:      Vascular: No carotid bruit or JVD  Cardiovascular:      Rate and Rhythm: Normal rate and regular rhythm  Heart sounds: Normal heart sounds  Pulmonary:      Effort: Pulmonary effort is normal  No respiratory distress  Breath sounds: Normal breath sounds  Comments: Patient complaining of hypo sensitization at left anterior upper chest wall to light touch  Lymphadenopathy:      Cervical: No cervical adenopathy  Skin:     General: Skin is warm and dry  Findings: No rash  Neurological:      General: No focal deficit present  Mental Status: She is alert and oriented to person, place, and time  Sensory: Sensory deficit (Hypesthesia left upper anterior chest wall) present  Psychiatric:         Mood and Affect: Mood normal          Behavior: Behavior normal            RESULTS:    No results found for this or any previous visit (from the past 1008 hour(s))  This note was created with voice recognition software  Phonic, grammatical and spelling errors may be present within the note as a result

## 2020-09-24 DIAGNOSIS — E78.2 MIXED HYPERLIPIDEMIA: Primary | ICD-10-CM

## 2020-11-30 ENCOUNTER — TELEPHONE (OUTPATIENT)
Dept: INTERNAL MEDICINE CLINIC | Facility: CLINIC | Age: 40
End: 2020-11-30

## 2021-03-17 ENCOUNTER — TELEMEDICINE (OUTPATIENT)
Dept: INTERNAL MEDICINE CLINIC | Facility: CLINIC | Age: 41
End: 2021-03-17
Payer: COMMERCIAL

## 2021-03-17 VITALS — SYSTOLIC BLOOD PRESSURE: 110 MMHG | HEART RATE: 93 BPM | DIASTOLIC BLOOD PRESSURE: 83 MMHG

## 2021-03-17 DIAGNOSIS — Z34.90 PREGNANCY, UNSPECIFIED GESTATIONAL AGE: Primary | ICD-10-CM

## 2021-03-17 PROCEDURE — 99213 OFFICE O/P EST LOW 20 MIN: CPT | Performed by: PHYSICIAN ASSISTANT

## 2021-03-17 NOTE — PROGRESS NOTES
Virtual Regular Visit      Assessment/Plan:    Problem List Items Addressed This Visit     None               Reason for visit is   Chief Complaint   Patient presents with    Virtual Regular Visit    Virtual Regular Visit        Encounter provider Maciej Leavitt PA-C    Provider located at [address] [place] [address]  [facility] [address]  [address] [place] [id number] [phone]      Recent Visits  No visits were found meeting these conditions  Showing recent visits within past 7 days and meeting all other requirements     Today's Visits  Date Type Provider Dept   03/17/21 Telemedicine Maciej Leavitt PA-C [facility] [address] today's visits and meeting all other requirements     Future Appointments  No visits were found meeting these conditions  Showing future appointments within next 150 days and meeting all other requirements        The patient was identified by name and date of birth  Amee Man was informed that this is a telemedicine visit and that the visit is being conducted through [address] and patient was informed that this is not a secure, HIPAA-compliant platform  She agrees to proceed     My office door was closed  No one else was in the room  She acknowledged consent and understanding of privacy and security of the video platform  The patient has agreed to participate and understands they can discontinue the visit at any time  Patient is aware this is a billable service  Subjective  Amee Man is a 37 y o  female    Having reviewed the EMR prior to this visit and discussed soon with the patient she is again pregnant  Because of her current age of 37 and the short interval between her pregnancies she is considered high risk  She is following with obstetric    Previously she was treated with sertraline for a postpartum depression however at her last visit she was contemplating weaning off the medication as she was doing well  She informs me she did wean off the medication she believes right before she became pregnant  At this time she states she is feeling well, no depressive symptomatology  She remark she gets asked this by the obstetric team frequently  She will keep me informed if anything changes or she needs to  "Vent"  Due date is middle to late July  No other focal concerns at this time  Past Medical History:   Diagnosis Date    Asthma     as a child    Chicken pox     patient had it twice    Eye problems     chronic herpes of R eye    Herpes ocular     Rectal fissure     last assessed - [id number]    Scarlet fever     patient had when they were an infant       Past Surgical History:   Procedure Laterality Date    CERVICAL BIOPSY  W/ LOOP ELECTRODE EXCISION      GYNECOLOGIC CRYOSURGERY      Cervix       Current Outpatient Medications   Medication Sig Dispense Refill    CALCIUM PO Take by mouth      loteprednol etabonate (LOTEMAX) 0 5 % ophthalmic suspension Apply 1-2 drops to eye 4 (four) times a day      Multiple Vitamin (multivitamin) capsule Take 1 capsule by mouth daily      Omega-3 Fatty Acids (FISH OIL) 645 MG CAPS Take by mouth      Prenatal Vit-Fe Fumarate-FA (PRENATAL VITAMIN) 27-0 8 MG TABS Take by mouth      RESTASIS MULTIDOSE 0 05 % ophthalmic emulsion       valACYclovir (VALTREX) 500 mg tablet Take 500 mg by mouth daily        ZIRGAN 0 15 % GEL       Loratadine (Claritin) 10 MG CAPS Take by mouth       No current facility-administered medications for this visit  Allergies   Allergen Reactions    Penicillins Rash       Review of Systems   Constitutional: Negative for activity change, chills, fatigue and fever  HENT: Negative for congestion  Eyes: Negative for discharge  Respiratory: Negative for cough, chest tightness and shortness of breath  Cardiovascular: Negative for chest pain, palpitations and leg swelling     Gastrointestinal: Negative for abdominal pain    Genitourinary: Negative for difficulty urinating  Musculoskeletal: Negative for arthralgias and myalgias  Skin: Negative for rash  Allergic/Immunologic: Negative for immunocompromised state  Neurological: Negative for dizziness, syncope, weakness, light-headedness and headaches  Hematological: Negative for adenopathy  Does not bruise/bleed easily  Psychiatric/Behavioral: Negative for dysphoric mood  The patient is not nervous/anxious  Video Exam    Vitals:    03/17/21 1629   BP: 110/83   BP Location: Right arm   Patient Position: Sitting   Pulse: 93       Physical Exam  Vitals signs and nursing note reviewed  Constitutional:       General: She is not in acute distress  Appearance: Normal appearance  She is well-developed  She is not ill-appearing  Comments: Pleasant, smiling  No evidence of distress  HENT:      Head: Normocephalic  Pulmonary:      Effort: Pulmonary effort is normal  No respiratory distress  Neurological:      General: No focal deficit present  Mental Status: She is alert and oriented to person, place, and time  Psychiatric:         Mood and Affect: Mood normal          Behavior: Behavior normal          Thought Content: Thought content normal          Judgment: Judgment normal           I spent 10 minutes directly with the patient during this visit      VIRTUAL VISIT DISCLAIMER    Luisa Urbinavel acknowledges that she has consented to an online visit or consultation  She understands that the online visit is based solely on information provided by her, and that, in the absence of a face-to-face physical evaluation by the physician, the diagnosis she receives is both limited and provisional in terms of accuracy and completeness  This is not intended to replace a full medical face-to-face evaluation by the physician  Luisa Alexis understands and accepts these terms

## 2021-06-10 ENCOUNTER — CLINICAL SUPPORT (OUTPATIENT)
Dept: INTERNAL MEDICINE CLINIC | Facility: CLINIC | Age: 41
End: 2021-06-10
Payer: COMMERCIAL

## 2021-06-10 DIAGNOSIS — Z23 NEED FOR TDAP VACCINATION: Primary | ICD-10-CM

## 2021-06-10 PROCEDURE — 90715 TDAP VACCINE 7 YRS/> IM: CPT

## 2021-06-10 PROCEDURE — 90471 IMMUNIZATION ADMIN: CPT

## 2021-07-28 ENCOUNTER — OFFICE VISIT (OUTPATIENT)
Dept: INTERNAL MEDICINE CLINIC | Facility: CLINIC | Age: 41
End: 2021-07-28
Payer: COMMERCIAL

## 2021-07-28 VITALS
SYSTOLIC BLOOD PRESSURE: 108 MMHG | WEIGHT: 149 LBS | HEART RATE: 78 BPM | DIASTOLIC BLOOD PRESSURE: 80 MMHG | TEMPERATURE: 98 F | OXYGEN SATURATION: 99 % | BODY MASS INDEX: 28.13 KG/M2 | HEIGHT: 61 IN | RESPIRATION RATE: 16 BRPM

## 2021-07-28 PROCEDURE — 99213 OFFICE O/P EST LOW 20 MIN: CPT | Performed by: PHYSICIAN ASSISTANT

## 2021-07-28 NOTE — PROGRESS NOTES
Assessment/Plan:   Patient Instructions    Will reinstitute sertraline 50 mg daily by starting at 25 mg daily for 2 weeks then increasing to 50 mg daily  We will get together in 6 weeks to ascertain its effectiveness, sooner as needed  Quality Measures: BMI Counseling: Body mass index is 28 15 kg/m²  The BMI is above normal  Nutrition recommendations include decreasing portion sizes, encouraging healthy choices of fruits and vegetables, consuming healthier snacks and moderation in carbohydrate intake  Exercise recommendations include exercising 3-5 times per week  Return in about 6 weeks (around 9/8/2021) for Next scheduled follow up  Diagnoses and all orders for this visit:    Post partum depression  -     sertraline (ZOLOFT) 50 mg tablet; Take 1 tablet (50 mg total) by mouth daily          Subjective:      Patient ID: Daisy Frazier is a 37 y o  female  Patient who previously had been treated for a postpartum depression, elected to go off her antidepressants during pregnancy, and admits towards the end of her pregnancy had significant anxiety and depressive symptomatology  Her midwives team even wanted her to start back on her medication prior to delivery but she decided not to  She did admit to me that 3 days ago she started back on a small dose of sertraline  She recognizes that this medication had been very beneficial for her and she would like to go back on it  She had no difficulty with her pregnancy or delivery        ALLERGIES:  Allergies   Allergen Reactions    Penicillins Rash       CURRENT MEDICATIONS:    Current Outpatient Medications:     loteprednol etabonate (LOTEMAX) 0 5 % ophthalmic suspension, Apply 1-2 drops to eye 4 (four) times a day, Disp: , Rfl:     Omega-3 Fatty Acids (FISH OIL) 645 MG CAPS, Take by mouth, Disp: , Rfl:     Prenatal Vit-Fe Fumarate-FA (PRENATAL VITAMIN) 27-0 8 MG TABS, Take by mouth, Disp: , Rfl:     valACYclovir (VALTREX) 500 mg tablet, Take 500 mg by mouth daily  , Disp: , Rfl:     ZIRGAN 0 15 % GEL, , Disp: , Rfl:     CALCIUM PO, Take by mouth (Patient not taking: Reported on 7/28/2021), Disp: , Rfl:     Multiple Vitamin (multivitamin) capsule, Take 1 capsule by mouth daily (Patient not taking: Reported on 7/28/2021), Disp: , Rfl:     RESTASIS MULTIDOSE 0 05 % ophthalmic emulsion, , Disp: , Rfl:     sertraline (ZOLOFT) 50 mg tablet, Take 1 tablet (50 mg total) by mouth daily, Disp: 90 tablet, Rfl: 1    ACTIVE PROBLEM LIST:  Patient Active Problem List   Diagnosis    Post partum depression    Palpitations       PAST MEDICAL HISTORY:  Past Medical History:   Diagnosis Date    Asthma     as a child    Chicken pox     patient had it twice    Eye problems     chronic herpes of R eye    Herpes ocular     Rectal fissure     last assessed - [id number]    Scarlet fever     patient had when they were an infant       PAST SURGICAL HISTORY:  Past Surgical History:   Procedure Laterality Date    CERVICAL BIOPSY  W/ LOOP ELECTRODE EXCISION      GYNECOLOGIC CRYOSURGERY      Cervix       FAMILY HISTORY:  Family History   Problem Relation Age of Onset    Hyperlipidemia Father     Heart disease Maternal Grandmother         cardiac disorder    Diabetes Maternal Grandmother     Leukemia Maternal Grandmother     Thyroid disease Maternal Grandfather     COPD Paternal Grandmother     Asthma Paternal Grandfather     Hypertension Paternal Grandfather     Colon cancer Other     Other Other         Epilepsy    Breast cancer Maternal Aunt     GI problems Mother        SOCIAL HISTORY:  Social History     Socioeconomic History    Marital status: /Civil Union     Spouse name: Not on file    Number of children: Not on file    Years of education: Not on file    Highest education level: Not on file   Occupational History    Occupation: 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Kindergarden   Tobacco Use    Smoking status: Never Smoker    Smokeless tobacco: Never Used   Substance and Sexual Activity    Alcohol use: No    Drug use: No    Sexual activity: Yes     Partners: Male   Other Topics Concern    Not on file   Social History Narrative    Always uses seat belt    Brushes teeth twice a day     Dental care, regularly    Exercise: Walking    Pets/Animals: Dog    No caffeine use    Recreation: Crafts     Social Determinants of Health     Financial Resource Strain:     Difficulty of Paying Living Expenses:    Food Insecurity:     Worried About Running Out of Food in the Last Year:     [address] in the Last Year:    Transportation Needs:     Lack of Transportation (Medical):  Lack of Transportation (Non-Medical):    Physical Activity:     Days of Exercise per Week:     Minutes of Exercise per Session:    Stress:     Feeling of Stress :    Social Connections:     Frequency of Communication with Friends and Family:     Frequency of Social Gatherings with Friends and Family:     Attends Nondenominational Services:     Active Member of Clubs or Organizations:     Attends Club or Organization Meetings:     Marital Status:    Intimate Partner Violence:     Fear of Current or Ex-Partner:     Emotionally Abused:     Physically Abused:     Sexually Abused:        Review of Systems   Constitutional: Negative for activity change, chills, fatigue and fever  HENT: Negative for congestion  Eyes: Negative for discharge  Respiratory: Negative for cough, chest tightness and shortness of breath  Cardiovascular: Negative for chest pain, palpitations and leg swelling  Gastrointestinal: Negative for abdominal pain  Genitourinary: Negative for difficulty urinating  Musculoskeletal: Negative for arthralgias and myalgias  Skin: Negative for rash  Allergic/Immunologic: Negative for immunocompromised state  Neurological: Negative for dizziness, syncope, weakness, light-headedness and headaches  Hematological: Negative for adenopathy   Does not bruise/bleed easily  Psychiatric/Behavioral: Positive for dysphoric mood  Negative for sleep disturbance and suicidal ideas  The patient is nervous/anxious  Objective:  Vitals:    07/28/21 1352   BP: 108/80   BP Location: Left arm   Patient Position: Sitting   Cuff Size: Standard   Pulse: 78   Resp: 16   Temp: 98 °F (36 7 °C)   TempSrc: Tympanic   SpO2: 99%   Weight: 67 6 kg (149 lb)   Height: 5' 1" (1 549 m)     Body mass index is 28 15 kg/m²  Physical Exam  Vitals and nursing note reviewed  Constitutional:       General: She is not in acute distress  Appearance: She is well-developed  HENT:      Head: Normocephalic and atraumatic  Eyes:      Pupils: Pupils are equal, round, and reactive to light  Neck:      Thyroid: No thyromegaly  Vascular: No carotid bruit or JVD  Cardiovascular:      Rate and Rhythm: Normal rate and regular rhythm  Heart sounds: Normal heart sounds  Pulmonary:      Effort: Pulmonary effort is normal  No respiratory distress  Breath sounds: Normal breath sounds  Musculoskeletal:      Cervical back: Neck supple  Right lower leg: No edema  Left lower leg: No edema  Lymphadenopathy:      Cervical: No cervical adenopathy  Skin:     General: Skin is warm and dry  Findings: No rash  Neurological:      General: No focal deficit present  Mental Status: She is alert and oriented to person, place, and time  Mental status is at baseline  Psychiatric:         Mood and Affect: Mood normal          Behavior: Behavior normal            RESULTS:    No results found for this or any previous visit (from the past 1008 hour(s))  This note was created with voice recognition software  Phonic, grammatical and spelling errors may be present within the note as a result

## 2021-07-28 NOTE — PATIENT INSTRUCTIONS
Will reinstitute sertraline 50 mg daily by starting at 25 mg daily for 2 weeks then increasing to 50 mg daily  We will get together in 6 weeks to ascertain its effectiveness, sooner as needed

## 2021-07-30 ENCOUNTER — TELEPHONE (OUTPATIENT)
Dept: POSTPARTUM | Facility: CLINIC | Age: 41
End: 2021-07-30

## 2021-07-30 NOTE — TELEPHONE ENCOUNTER
Feedings are painful on both breasts but more so on the right breast   She feels that [place] Session is an "aggressive" feeder  He does not open wide to latch  She does feel that he is well fed at the breast   I encouraged Elvira Carr to continue to try to improve her comfort by trying different positioning that has Cade belly down on her body with the nipple just below her nose  I also suggested tummy time to improve any muscle tension that may be present  I suggested pumping and feeding expressed milk if at any time she is concerned that [place] Session is not getting enough milk or if feeding directly at the breast is too painful for her  I gave instructions for moist wound care for her damaged nipples  She will follow up with Dr Geena Ramon as scheduled for more evaluation and support

## 2021-07-30 NOTE — TELEPHONE ENCOUNTER
Spoke with Salvador Clemens (9days) - is tearing up her right nipple - said cracked / red bump/blister on it - she's applying lanolin & coconut oil to it - what else can she do - this is 4th child & this hasn't happened before - Also per Ped - tongue tied  Appt for Dr Karin Phipps made for 8/11

## 2021-08-11 ENCOUNTER — OFFICE VISIT (OUTPATIENT)
Dept: POSTPARTUM | Facility: CLINIC | Age: 41
End: 2021-08-11

## 2021-08-11 DIAGNOSIS — O92.29 SORE NIPPLES DUE TO LACTATION: ICD-10-CM

## 2021-08-11 NOTE — PROGRESS NOTES
INITIAL BREAST FEEDING EVALUATION    Informant/Relationship: Patti/mom    Discussion of General Lactation Issues: This is Patti's fourth child  She nursed each of her other children for 13, 11, and 9 months respectively  She was told to supplement with the second and third by around 2 months and the third eventually had a lip and tongue frenotomy by the dentist  Edna Lagunas was told by the NP in her pediatric practice that Yina Ni is tongue tied  Infant is 4 weeks old today          History:  Fertility Problem:no  Breast changes:yes - get bigger, tenderness with first pregnancy  : yes - no complications  Full term:yes - 39 6/7   labor:no  First nursing/attempt < 1 hour after birth:yes - about 30 minutes following delivery  Skin to skin following delivery:yes - after a brief resuscitation for poor color  Breast changes after delivery:yes - about 2 days; got "the tingle"  Rooming in (infant in room with mother with exception of procedures, eg  Circumcision: yes - went to the nursery for hearing and blood tests, went to the nursery for 2 hours so mom could sleep once  Blood sugar issues:no  NICU stay:no  Jaundice:yes - monitored  Phototherapy:no  Supplement given: (list supplement and method used as well as reason(s):yes - got 2 oz of formula because of the jaundice; this was daily for 2 days    Past Medical History:   Diagnosis Date    Asthma     as a child    Chicken pox     patient had it twice    Eye problems     chronic herpes of R eye    Herpes ocular     Post partum depression     Rectal fissure     last assessed - [id number]    Scarlet fever     patient had when they were an infant         Current Outpatient Medications:     CALCIUM PO, Take by mouth (Patient not taking: Reported on 2021), Disp: , Rfl:     loteprednol etabonate (LOTEMAX) 0 5 % ophthalmic suspension, Apply 1-2 drops to eye 4 (four) times a day, Disp: , Rfl:     Multiple Vitamin (multivitamin) capsule, Take 1 capsule by mouth daily (Patient not taking: Reported on 7/28/2021), Disp: , Rfl:     Omega-3 Fatty Acids (FISH OIL) 645 MG CAPS, Take by mouth, Disp: , Rfl:     Prenatal Vit-Fe Fumarate-FA (PRENATAL VITAMIN) 27-0 8 MG TABS, Take by mouth, Disp: , Rfl:     RESTASIS MULTIDOSE 0 05 % ophthalmic emulsion, , Disp: , Rfl:     sertraline (ZOLOFT) 50 mg tablet, Take 1 tablet (50 mg total) by mouth daily, Disp: 90 tablet, Rfl: 1    valACYclovir (VALTREX) 500 mg tablet, Take 500 mg by mouth daily  , Disp: , Rfl:     ZIRGAN 0 15 % GEL, , Disp: , Rfl:     Allergies   Allergen Reactions    Penicillins Rash       Social History     Substance and Sexual Activity   Drug Use No       Social History     Interval Breastfeeding History:    Frequency of breast feeding: about every 3 hours, at night he may go 4 hours  Does mother feel breastfeeding is effective: Yes  Does infant appear satisfied after nursing:Yes  Stooling pattern normal: lYes  Urinating frequently:Yes  Using shield or shells: No    Alternative/Artificial Feedings:   Bottle: Yes, occasional bottle  Cup: No  Syringe/Finger: No           Formula Type: offered Similac ProAdvance                     Amount: 2 oz offered 3 times            Breast Milk:                      Amount: not offered in bottle            Frequency Q 3 Hr between feedings  Elimination Problems: No      Equipment:  Nipple Shield             Type: n/a             Size: n/a             Frequency of Use: n/a  Pump            Type: Spectra and Medela PIS            Frequency of Use: not used yet  Shells            Type: n/a            Frequency of use: n/a    Equipment Problems: no    Mom:  Breast: Normal and Full, No breast tenderness  Nipple Assessment in General: Normal: elongated/eraser, no discoloration and no damage noted, Slight tenderness with gentle tapping on the tip  Mother's Awareness of Feeding Cues                 Recognizes:  Yes                  Verbalizes: Yes  Support System: FOB  History of Breastfeeding:  her three older children for 15, 11, and 9 months respectively; supplemented  Changes/Stressors/Violence: Pain with nursing  Concerns/Goals: Glory Collins would like to nurse as long as she can, but at least 7 months  She would like to resolve her pain    Problems with Mom: Pain with nursing    Physical Exam  Constitutional:       Appearance: Normal appearance  She is well-developed and normal weight  HENT:      Head: Normocephalic and atraumatic  Eyes:      Extraocular Movements: Extraocular movements intact  Neck:      Thyroid: No thyromegaly  Cardiovascular:      Rate and Rhythm: Normal rate and regular rhythm  Pulses: Normal pulses  Heart sounds: Normal heart sounds  No murmur heard  Pulmonary:      Effort: Pulmonary effort is normal       Breath sounds: Normal breath sounds  Musculoskeletal:         General: No swelling or tenderness  Normal range of motion  Cervical back: Normal range of motion and neck supple  Right lower leg: No edema  Lymphadenopathy:      Cervical: No cervical adenopathy  Upper Body:      Right upper body: No pectoral adenopathy  Left upper body: No pectoral adenopathy  Neurological:      General: No focal deficit present  Mental Status: She is alert and oriented to person, place, and time  Psychiatric:         Mood and Affect: Mood normal          Behavior: Behavior normal          Thought Content: Thought content normal          Judgment: Judgment normal    Vitals and nursing note reviewed           Infant:  Behaviors: Alert  Color: Healthy  Birth weight: 3 938 kg  Current weight: 4 425 kg    Problems with infant: Restricted tongue movement      General Appearance:  Alert, active, no distress                             Head:  Normocephalic, AFOF, sutures opposed                             Eyes:  Conjunctiva clear, no drainage                              Ears:  Normally placed, no anomolies Nose:  Septum intact, no drainage or erythema                           Mouth:  No lesions; decreased lift and extension; poor lateralization; tongue cups examiner's finger but has poor peristalsis with some snap back and compression of tip of examiner's finger against palate; frenulum is thin and tight extending from 5 mm behind the tip of the tongue to the top of the lower alveolar ridge  Neck:  Supple, symmetrical, trachea midline, no adenopathy; thyroid: no enlargement, symmetric, no tenderness/mass/nodules                 Respiratory:  No grunting, flaring, retractions, breath sounds clear and equal            Cardiovascular:  Regular rate and rhythm  No murmur  Adequate perfusion/capillary refill  Femoral pulse present                    Abdomen:   Soft, non-tender, no masses, bowel sounds present, no HSM             Genitourinary:  Normal male, testes descended, no discharge, swelling, or pain, anus patent                          Spine:   No abnormalities noted        Musculoskeletal:  Full range of motion          Skin/Hair/Nails:   Skin warm, dry, and intact, no rashes or abnormal dyspigmentation or lesions                Neurologic:   No abnormal movement, tone appropriate for gestational age     Latch:  Efficiency:               Lips Flanged: Yes, after frenotomy              Depth of latch: Very good, after frenotomy              Audible Swallow: Yes, after frenotomy              Visible Milk: Yes, after frenotomy              Wide Open/ Asymmetrical: Yes, after frenotomy              Suck Swallow Cycle: Breathing: Unlabored, Coordinated: Yes  Nipple Assessment after latch: Normal: elongated/eraser, no discoloration and no damage noted, after frenotomy  Latch Problems: Before the frenotomy, Cade's latch looks wide but his lips are curled in and [place] describes discomfort/pain  Her nipple is slightly flattened with some whiteness at the tip when released   After the frenotomy, Luanne Teran is easily able to assist Cade to a wider, deeper, more asymmetrical and more comfortable latch  He quickly develops a sustained S/S/B and nurses at both breasts until content with no noticeable changes in nipple shape  Position:  Infant's Ergonomics/Body               Body Alignment: Yes               Head Supported: Yes               Close to Mom's body/ Lifted/ Supported: Yes               Mom's Ergonomics/Body: Yes                           Supported: Yes                           Sitting Back: Yes                           Brings Baby to her breast: Yes  Positioning Problems: None        Education:  Reviewed Latch: Reviewed how to gently compress the breast as if offering a sandwich to facilitate a deeper latch  Reviewed Positioning for Dyad: Reviewed how to bring baby to the breast so that his lower lip and chin touch the breast with his nose just above the nipple to encourage a wider, more asymmetric latch  Reviewed Frequency/Supply & Demand: Recommended feeding on demand: when the baby gives hunger cues, when the breasts feel full, every 3 hours during the day and every 5 hours at night counting from the beginning of one feeding to the beginning of the next; whichever comes first    Reviewed Alternative/Artificial Feedings: Paced bottle feedings for any supplement  Reviewed Mom/Breast care: Apply ointment to the nipples, especially the right, and cover with wax paper until sensitivity has resolved        Plan:  Discussed history and physical exams with Luanne Teran  Support given for her commitment to providing breast milk for her baby  Discussed the findings on the baby's exam consistent with tongue tie and reviewed how this may be the cause of nipple trauma, nipple pain, nipple damage, poor milk transfer, blocked ducts, mastitis, and loss of milk production  Discussed the science that supports performing the frenotomy to improve latch        I have spent 40 minutes with Patient  today in which greater than 50% of this time was spent in counseling/coordination of care regarding Prognosis, Risks and benefits of tx options, Intructions for management, Patient and family education and Impressions

## 2021-09-14 ENCOUNTER — OFFICE VISIT (OUTPATIENT)
Dept: INTERNAL MEDICINE CLINIC | Facility: CLINIC | Age: 41
End: 2021-09-14
Payer: COMMERCIAL

## 2021-09-14 VITALS
SYSTOLIC BLOOD PRESSURE: 102 MMHG | HEART RATE: 75 BPM | TEMPERATURE: 98.5 F | OXYGEN SATURATION: 98 % | BODY MASS INDEX: 27 KG/M2 | DIASTOLIC BLOOD PRESSURE: 66 MMHG | HEIGHT: 61 IN | WEIGHT: 143 LBS

## 2021-09-14 DIAGNOSIS — Z12.31 ENCOUNTER FOR SCREENING MAMMOGRAM FOR MALIGNANT NEOPLASM OF BREAST: ICD-10-CM

## 2021-09-14 PROCEDURE — 99213 OFFICE O/P EST LOW 20 MIN: CPT | Performed by: PHYSICIAN ASSISTANT

## 2021-09-14 NOTE — PATIENT INSTRUCTIONS
We have agreed to increase the sertraline to 75 mg daily  Patient will return for annual physical in 2 months and will discuss effectiveness at that time or patient will call me if there is any difficulty in between

## 2021-09-14 NOTE — PROGRESS NOTES
Assessment/Plan:      Patient Instructions    We have agreed to increase the sertraline to 75 mg daily  Patient will return for annual physical in 2 months and will discuss effectiveness at that time or patient will call me if there is any difficulty in between  Quality Measures:       Return in about 2 months (around 2021) for Annual physical          Diagnoses and all orders for this visit:    Post partum depression  -     sertraline (ZOLOFT) 50 mg tablet; Take 1 5 tablets (75 mg total) by mouth daily    Encounter for screening mammogram for malignant neoplasm of breast    Other orders  -     Cancel: Mammo screening bilateral w 3d & cad; Future          Subjective:      Patient ID: Heather Arrieta is a 37 y o  female  Follow-up     We re-initiated sertraline at last visit due to postpartum depression  She feels current dose 50 mg is helpful but she still has anxious moments and feels stressed at times  She would like a trial of a slightly increased dose  She is a mother of 3 young children, with 1   Denies side effects  No other focal issues    Would like to schedule her annual physical       ALLERGIES:  Allergies   Allergen Reactions    Penicillins Rash       CURRENT MEDICATIONS:    Current Outpatient Medications:     loteprednol etabonate (LOTEMAX) 0 5 % ophthalmic suspension, Apply 1-2 drops to eye 4 (four) times a day, Disp: , Rfl:     Omega-3 Fatty Acids (FISH OIL) 645 MG CAPS, Take by mouth, Disp: , Rfl:     Prenatal Vit-Fe Fumarate-FA (PRENATAL VITAMIN) 27-0 8 MG TABS, Take by mouth, Disp: , Rfl:     RESTASIS MULTIDOSE 0 05 % ophthalmic emulsion, , Disp: , Rfl:     sertraline (ZOLOFT) 50 mg tablet, Take 1 5 tablets (75 mg total) by mouth daily, Disp: 135 tablet, Rfl: 1    valACYclovir (VALTREX) 500 mg tablet, Take 500 mg by mouth daily  , Disp: , Rfl:     ACTIVE PROBLEM LIST:  Patient Active Problem List   Diagnosis    Post partum depression    Palpitations       PAST MEDICAL HISTORY:  Past Medical History:   Diagnosis Date    Asthma     as a child    Chicken pox     patient had it twice    Eye problems     chronic herpes of R eye    Herpes ocular     Post partum depression     Rectal fissure     last assessed - [id number]    Scarlet fever     patient had when they were an infant       PAST SURGICAL HISTORY:  Past Surgical History:   Procedure Laterality Date    CERVICAL BIOPSY  W/ LOOP ELECTRODE EXCISION      GYNECOLOGIC CRYOSURGERY      Cervix       FAMILY HISTORY:  Family History   Problem Relation Age of Onset    Hyperlipidemia Father     Heart disease Maternal Grandmother         cardiac disorder    Diabetes Maternal Grandmother     Leukemia Maternal Grandmother     Thyroid disease Maternal Grandfather     COPD Paternal Grandmother     Asthma Paternal Grandfather     Hypertension Paternal Grandfather     Colon cancer Other     Other Other         Epilepsy    Breast cancer Maternal Aunt     GI problems Mother        SOCIAL HISTORY:  Social History     Socioeconomic History    Marital status: /Civil Union     Spouse name: Not on file    Number of children: Not on file    Years of education: Not on file    Highest education level: Not on file   Occupational History    Occupation: 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Kindergarden   Tobacco Use    Smoking status: Never Smoker    Smokeless tobacco: Never Used   Substance and Sexual Activity    Alcohol use: No    Drug use: No    Sexual activity: Yes     Partners: Male   Other Topics Concern    Not on file   Social History Narrative    Always uses seat belt    Brushes teeth twice a day     Dental care, regularly    Exercise: Walking    Pets/Animals: Dog    No caffeine use    Recreation: Crafts     Social Determinants of Health     Financial Resource Strain:     Difficulty of Paying Living Expenses:    Food Insecurity:     Worried About Running Out of Food in the Last Year:     Ran Out of Food in the Last Year:    Transportation Needs:     Lack of Transportation (Medical):  Lack of Transportation (Non-Medical):    Physical Activity:     Days of Exercise per Week:     Minutes of Exercise per Session:    Stress:     Feeling of Stress :    Social Connections:     Frequency of Communication with Friends and Family:     Frequency of Social Gatherings with Friends and Family:     Attends Anabaptism Services:     Active Member of Clubs or Organizations:     Attends Club or Organization Meetings:     Marital Status:    Intimate Partner Violence:     Fear of Current or Ex-Partner:     Emotionally Abused:     Physically Abused:     Sexually Abused:        Review of Systems   Constitutional: Negative for activity change, chills, fatigue and fever  HENT: Negative for congestion  Eyes: Negative for discharge  Respiratory: Negative for cough, chest tightness and shortness of breath  Cardiovascular: Negative for chest pain, palpitations and leg swelling  Gastrointestinal: Negative for abdominal pain  Genitourinary: Negative for difficulty urinating  Musculoskeletal: Negative for arthralgias and myalgias  Skin: Negative for rash  Allergic/Immunologic: Negative for immunocompromised state  Neurological: Negative for dizziness, syncope, weakness, light-headedness and headaches  Hematological: Negative for adenopathy  Does not bruise/bleed easily  Psychiatric/Behavioral: Negative for dysphoric mood  The patient is nervous/anxious  Objective:  Vitals:    09/14/21 1422   BP: 102/66   Pulse: 75   Temp: 98 5 °F (36 9 °C)   SpO2: 98%   Weight: 64 9 kg (143 lb)   Height: 5' 1" (1 549 m)     Body mass index is 27 02 kg/m²  Physical Exam  Vitals and nursing note reviewed  Constitutional:       General: She is not in acute distress  Appearance: She is well-developed  HENT:      Head: Normocephalic and atraumatic  Eyes:      Pupils: Pupils are equal, round, and reactive to light  Neck:      Thyroid: No thyromegaly  Vascular: No carotid bruit or JVD  Cardiovascular:      Rate and Rhythm: Normal rate and regular rhythm  Heart sounds: Normal heart sounds  Pulmonary:      Effort: Pulmonary effort is normal  No respiratory distress  Breath sounds: Normal breath sounds  Musculoskeletal:      Cervical back: Neck supple  Right lower leg: No edema  Left lower leg: No edema  Lymphadenopathy:      Cervical: No cervical adenopathy  Skin:     General: Skin is warm and dry  Findings: No rash  Neurological:      General: No focal deficit present  Mental Status: She is alert and oriented to person, place, and time  Mental status is at baseline  Psychiatric:         Mood and Affect: Mood normal          Behavior: Behavior normal            RESULTS:    No results found for this or any previous visit (from the past 1008 hour(s))  This note was created with voice recognition software  Phonic, grammatical and spelling errors may be present within the note as a result

## 2021-12-01 ENCOUNTER — OFFICE VISIT (OUTPATIENT)
Dept: INTERNAL MEDICINE CLINIC | Facility: CLINIC | Age: 41
End: 2021-12-01
Payer: COMMERCIAL

## 2021-12-01 VITALS
DIASTOLIC BLOOD PRESSURE: 76 MMHG | SYSTOLIC BLOOD PRESSURE: 120 MMHG | HEIGHT: 61 IN | HEART RATE: 76 BPM | TEMPERATURE: 98.6 F | BODY MASS INDEX: 25.86 KG/M2 | WEIGHT: 137 LBS | OXYGEN SATURATION: 98 % | RESPIRATION RATE: 16 BRPM

## 2021-12-01 DIAGNOSIS — E78.2 MIXED HYPERLIPIDEMIA: ICD-10-CM

## 2021-12-01 DIAGNOSIS — Z00.00 ANNUAL PHYSICAL EXAM: Primary | ICD-10-CM

## 2021-12-01 DIAGNOSIS — Z23 FLU VACCINE NEED: ICD-10-CM

## 2021-12-01 PROCEDURE — 90686 IIV4 VACC NO PRSV 0.5 ML IM: CPT | Performed by: PHYSICIAN ASSISTANT

## 2021-12-01 PROCEDURE — 90471 IMMUNIZATION ADMIN: CPT | Performed by: PHYSICIAN ASSISTANT

## 2021-12-01 PROCEDURE — 99396 PREV VISIT EST AGE 40-64: CPT | Performed by: PHYSICIAN ASSISTANT

## 2021-12-10 ENCOUNTER — TELEMEDICINE (OUTPATIENT)
Dept: INTERNAL MEDICINE CLINIC | Facility: CLINIC | Age: 41
End: 2021-12-10
Payer: COMMERCIAL

## 2021-12-10 VITALS — WEIGHT: 137 LBS | BODY MASS INDEX: 25.86 KG/M2 | HEIGHT: 61 IN | TEMPERATURE: 98 F

## 2021-12-10 DIAGNOSIS — B34.9 VIRAL INFECTION, UNSPECIFIED: Primary | ICD-10-CM

## 2021-12-10 PROCEDURE — 99213 OFFICE O/P EST LOW 20 MIN: CPT | Performed by: PHYSICIAN ASSISTANT

## 2021-12-10 PROCEDURE — U0003 INFECTIOUS AGENT DETECTION BY NUCLEIC ACID (DNA OR RNA); SEVERE ACUTE RESPIRATORY SYNDROME CORONAVIRUS 2 (SARS-COV-2) (CORONAVIRUS DISEASE [COVID-19]), AMPLIFIED PROBE TECHNIQUE, MAKING USE OF HIGH THROUGHPUT TECHNOLOGIES AS DESCRIBED BY CMS-2020-01-R: HCPCS | Performed by: PHYSICIAN ASSISTANT

## 2021-12-10 PROCEDURE — U0005 INFEC AGEN DETEC AMPLI PROBE: HCPCS | Performed by: PHYSICIAN ASSISTANT

## 2021-12-10 RX ORDER — DROSPIRENONE 4 MG/1
TABLET, FILM COATED ORAL
COMMUNITY
Start: 2021-11-24 | End: 2022-04-20

## 2021-12-11 LAB — SARS-COV-2 RNA RESP QL NAA+PROBE: POSITIVE

## 2021-12-15 ENCOUNTER — TELEMEDICINE (OUTPATIENT)
Dept: INTERNAL MEDICINE CLINIC | Facility: CLINIC | Age: 41
End: 2021-12-15
Payer: COMMERCIAL

## 2021-12-15 VITALS — WEIGHT: 137 LBS | HEIGHT: 61 IN | BODY MASS INDEX: 25.86 KG/M2

## 2021-12-15 DIAGNOSIS — U07.1 COVID-19 VIRUS INFECTION: Primary | ICD-10-CM

## 2021-12-15 PROCEDURE — 99212 OFFICE O/P EST SF 10 MIN: CPT | Performed by: PHYSICIAN ASSISTANT

## 2021-12-15 PROCEDURE — 3008F BODY MASS INDEX DOCD: CPT | Performed by: PHYSICIAN ASSISTANT

## 2022-04-20 ENCOUNTER — OFFICE VISIT (OUTPATIENT)
Dept: INTERNAL MEDICINE CLINIC | Facility: CLINIC | Age: 42
End: 2022-04-20
Payer: COMMERCIAL

## 2022-04-20 VITALS
BODY MASS INDEX: 26.06 KG/M2 | DIASTOLIC BLOOD PRESSURE: 66 MMHG | OXYGEN SATURATION: 98 % | HEIGHT: 61 IN | WEIGHT: 138 LBS | HEART RATE: 92 BPM | SYSTOLIC BLOOD PRESSURE: 94 MMHG | RESPIRATION RATE: 14 BRPM | TEMPERATURE: 99.7 F

## 2022-04-20 DIAGNOSIS — Z12.31 SCREENING MAMMOGRAM, ENCOUNTER FOR: ICD-10-CM

## 2022-04-20 DIAGNOSIS — J37.0 CHRONIC LARYNGITIS: Primary | ICD-10-CM

## 2022-04-20 PROCEDURE — 3008F BODY MASS INDEX DOCD: CPT | Performed by: PHYSICIAN ASSISTANT

## 2022-04-20 PROCEDURE — 99214 OFFICE O/P EST MOD 30 MIN: CPT | Performed by: PHYSICIAN ASSISTANT

## 2022-04-20 RX ORDER — SERTRALINE HYDROCHLORIDE 100 MG/1
100 TABLET, FILM COATED ORAL DAILY
Qty: 90 TABLET | Refills: 1 | Status: SHIPPED | OUTPATIENT
Start: 2022-04-20

## 2022-04-20 NOTE — PROGRESS NOTES
Assessment/Plan:   Patient Instructions   Continue current dose sertraline 100 mg daily  No other changes  Hopefully you will hear from ENT for an upcoming appointment  If not please get in touch with me  Quality Measures: BMI Counseling: Body mass index is 26 07 kg/m²  The BMI is above normal  Nutrition recommendations include decreasing portion sizes, encouraging healthy choices of fruits and vegetables, consuming healthier snacks, moderation in carbohydrate intake and reducing intake of cholesterol  Exercise recommendations include exercising 3-5 times per week  Rationale for BMI follow-up plan is due to patient being overweight or obese  Return for Next scheduled follow up  Diagnoses and all orders for this visit:    Chronic laryngitis  -     Ambulatory Referral to Otolaryngology; Future    Post partum depression  -     sertraline (ZOLOFT) 100 mg tablet; Take 1 tablet (100 mg total) by mouth daily    Screening mammogram, encounter for  -     Mammo screening bilateral w 3d & cad; Future    Other orders  -     tobramycin-dexamethasone (TOBRADEX) ophthalmic ointment; 0 5 inches 2 (two) times a day          Subjective:      Patient ID: Jorge Mcadams is a 40 y o  female  Acute visit    Increase in multiple psychosocial stressors  Having problems with right eye, her cornea, chronic herpes infection, will need cornea transplant  Has appointment with eye specialty scheduled  Vision out of that eye is blurred  Work has been very stressful  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  Finds her client tell difficult along with her support or lack of support  Mother of multiple young children  Has increased her dose of sertraline to 100 mg, 2 weeks ago  Is finding this dose to be effective  Still becomes anxious at times  Since being back at work has been experiencing a chronic laryngitis with her voice being weak    Has been trying to get an appointment with ENT        ALLERGIES:  Allergies   Allergen Reactions    Penicillins Rash       CURRENT MEDICATIONS:    Current Outpatient Medications:     Omega-3 Fatty Acids (FISH OIL) 645 MG CAPS, Take by mouth, Disp: , Rfl:     Prenatal Vit-Fe Fumarate-FA (PRENATAL VITAMIN) 27-0 8 MG TABS, Take by mouth, Disp: , Rfl:     sertraline (ZOLOFT) 100 mg tablet, Take 1 tablet (100 mg total) by mouth daily, Disp: 90 tablet, Rfl: 1    tobramycin-dexamethasone (TOBRADEX) ophthalmic ointment, 0 5 inches 2 (two) times a day, Disp: , Rfl:     valACYclovir (VALTREX) 500 mg tablet, Take 500 mg by mouth daily  , Disp: , Rfl:     ACTIVE PROBLEM LIST:  Patient Active Problem List   Diagnosis    Post partum depression    Palpitations       PAST MEDICAL HISTORY:  Past Medical History:   Diagnosis Date    Anxiety Sep 2018    Asthma     as a child    Chicken pox     patient had it twice    Eye problems     chronic herpes of R eye    Herpes ocular     Post partum depression     Rectal fissure     last assessed - [id number]    Scarlet fever     patient had when they were an infant       PAST SURGICAL HISTORY:  Past Surgical History:   Procedure Laterality Date    CERVICAL BIOPSY  W/ LOOP ELECTRODE EXCISION      GYNECOLOGIC CRYOSURGERY      Cervix       FAMILY HISTORY:  Family History   Problem Relation Age of Onset    Hyperlipidemia Father     Heart disease Maternal Grandmother         cardiac disorder    Diabetes Maternal Grandmother     Leukemia Maternal Grandmother     Thyroid disease Maternal Grandfather     COPD Paternal Grandmother     Asthma Paternal Grandfather     Hypertension Paternal Grandfather     Colon cancer Other     Other Other         Epilepsy    Breast cancer Maternal Aunt     GI problems Mother     Breast cancer Paternal Aunt        SOCIAL HISTORY:  Social History     Socioeconomic History    Marital status: /Civil Union     Spouse name: Not on file    Number of children: Not on file    Years of education: Not on file    Highest education level: Not on file   Occupational History    Occupation: 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[employer]   Tobacco Use    Smoking status: Never Smoker    Smokeless tobacco: Never Used   Substance and Sexual Activity    Alcohol use: No    Drug use: No    Sexual activity: Yes     Partners: Male   Other Topics Concern    Not on file   Social History Narrative    Always uses seat belt    Brushes teeth twice a day     Dental care, regularly    Exercise: Walking    Pets/Animals: Dog    No caffeine use    Recreation: [email or web address]    Teacher-[employer]     Social Determinants of Health     Financial Resource Strain: Not on file   Food Insecurity: Not on file   Transportation Needs: Not on file   Physical Activity: Not on file   Stress: Not on file   Social Connections: Not on file   Intimate Partner Violence: Not on file   Housing Stability: Not on file       Review of Systems   Constitutional: Negative for activity change, chills, fatigue and fever  HENT: Negative for congestion  Eyes: Negative for discharge  Respiratory: Negative for cough, chest tightness and shortness of breath  Cardiovascular: Negative for chest pain, palpitations and leg swelling  Gastrointestinal: Negative for abdominal pain  Genitourinary: Negative for difficulty urinating  Musculoskeletal: Negative for arthralgias and myalgias  Skin: Negative for rash  Allergic/Immunologic: Negative for immunocompromised state  Neurological: Negative for dizziness, syncope, weakness, light-headedness and headaches  Hematological: Negative for adenopathy  Does not bruise/bleed easily  Psychiatric/Behavioral: Positive for decreased concentration and dysphoric mood  Negative for sleep disturbance and suicidal ideas  The patient is nervous/anxious            Objective:  Vitals:    04/20/22 1617   BP: 94/66   BP Location: Right arm   Patient Position: Sitting   Cuff Size: Adult   Pulse: 92   Resp: 14   Temp: 99 7 °F (37 6 °C)   TempSrc: Tympanic   SpO2: 98%   Weight: 62 6 kg (138 lb)   Height: 5' 1" (1 549 m)     Body mass index is 26 07 kg/m²  Physical Exam  Vitals and nursing note reviewed  Constitutional:       General: She is not in acute distress  Appearance: She is well-developed  HENT:      Head: Normocephalic and atraumatic  Eyes:      Extraocular Movements: Extraocular movements intact  Pupils: Pupils are equal, round, and reactive to light  Neck:      Thyroid: No thyromegaly  Vascular: No carotid bruit or JVD  Cardiovascular:      Rate and Rhythm: Normal rate and regular rhythm  Heart sounds: Normal heart sounds  Pulmonary:      Effort: Pulmonary effort is normal  No respiratory distress  Breath sounds: Normal breath sounds  Musculoskeletal:      Cervical back: Neck supple  Right lower leg: No edema  Left lower leg: No edema  Lymphadenopathy:      Cervical: No cervical adenopathy  Skin:     General: Skin is warm and dry  Findings: No rash  Neurological:      General: No focal deficit present  Mental Status: She is alert and oriented to person, place, and time  Mental status is at baseline  Psychiatric:         Mood and Affect: Mood normal          Behavior: Behavior normal          Thought Content: Thought content normal          Judgment: Judgment normal            RESULTS:    No results found for this or any previous visit (from the past 1008 hour(s))  This note was created with voice recognition software  Phonic, grammatical and spelling errors may be present within the note as a result

## 2022-04-20 NOTE — PATIENT INSTRUCTIONS
Continue current dose sertraline 100 mg daily  No other changes  Hopefully you will hear from ENT for an upcoming appointment  If not please get in touch with me

## 2022-06-27 ENCOUNTER — OFFICE VISIT (OUTPATIENT)
Dept: INTERNAL MEDICINE CLINIC | Facility: CLINIC | Age: 42
End: 2022-06-27
Payer: COMMERCIAL

## 2022-06-27 VITALS
WEIGHT: 136.2 LBS | SYSTOLIC BLOOD PRESSURE: 100 MMHG | HEART RATE: 78 BPM | RESPIRATION RATE: 16 BRPM | DIASTOLIC BLOOD PRESSURE: 62 MMHG | OXYGEN SATURATION: 97 % | HEIGHT: 61 IN | BODY MASS INDEX: 25.71 KG/M2 | TEMPERATURE: 98.8 F

## 2022-06-27 DIAGNOSIS — Z11.59 NEED FOR HEPATITIS C SCREENING TEST: ICD-10-CM

## 2022-06-27 PROCEDURE — 3008F BODY MASS INDEX DOCD: CPT | Performed by: PHYSICIAN ASSISTANT

## 2022-06-27 PROCEDURE — 99213 OFFICE O/P EST LOW 20 MIN: CPT | Performed by: PHYSICIAN ASSISTANT

## 2022-06-27 NOTE — PROGRESS NOTES
Assessment/Plan:   Patient Instructions   Continue current medications  Follow up in 6 months for annual PE  Quality Measures:       Return in about 6 months (around 12/27/2022) for Annual physical          Diagnoses and all orders for this visit:    Post partum depression    Need for hepatitis C screening test  -     Hepatitis C antibody; Future          Subjective:      Patient ID: Kostas Suarez is a 40 y o  female  Follow-up    Postpartum depression:  Patient present today with her 4 children, states that she is doing well with the sertraline dose at 100 mg daily  She would like to keep the dose the same  Right eye is doing better, the herpes disease is "under control under "  Hepatitis C screening discussed  Patient in agreement        ALLERGIES:  Allergies   Allergen Reactions    Penicillins Rash       CURRENT MEDICATIONS:    Current Outpatient Medications:     Omega-3 Fatty Acids (FISH OIL) 645 MG CAPS, Take by mouth, Disp: , Rfl:     Prenatal Vit-Fe Fumarate-FA (PRENATAL VITAMIN) 27-0 8 MG TABS, Take by mouth, Disp: , Rfl:     sertraline (ZOLOFT) 100 mg tablet, Take 1 tablet (100 mg total) by mouth daily, Disp: 90 tablet, Rfl: 1    tobramycin-dexamethasone (TOBRADEX) ophthalmic ointment, 0 5 inches 2 (two) times a day, Disp: , Rfl:     valACYclovir (VALTREX) 500 mg tablet, Take 500 mg by mouth daily  , Disp: , Rfl:     ACTIVE PROBLEM LIST:  Patient Active Problem List   Diagnosis    Post partum depression    Palpitations       PAST MEDICAL HISTORY:  Past Medical History:   Diagnosis Date    Anxiety Sep 2018    Asthma     as a child    Chicken pox     patient had it twice    Eye problems     chronic herpes of R eye    Herpes ocular     Post partum depression     Rectal fissure     last assessed - [id number]    Scarlet fever     patient had when they were an infant       PAST SURGICAL HISTORY:  Past Surgical History:   Procedure Laterality Date    CERVICAL BIOPSY  W/ LOOP ELECTRODE EXCISION      GYNECOLOGIC CRYOSURGERY      Cervix       FAMILY HISTORY:  Family History   Problem Relation Age of Onset    Hyperlipidemia Father     Heart disease Maternal Grandmother         cardiac disorder    Diabetes Maternal Grandmother     Leukemia Maternal Grandmother     Thyroid disease Maternal Grandfather     COPD Paternal Grandmother     Asthma Paternal Grandfather     Hypertension Paternal Grandfather     Colon cancer Other     Other Other         Epilepsy    Breast cancer Maternal Aunt     GI problems Mother     Breast cancer Paternal Aunt        SOCIAL HISTORY:  Social History     Socioeconomic History    Marital status: /Civil Union     Spouse name: Not on file    Number of children: Not on file    Years of education: Not on file    Highest education level: Not on file   Occupational History    Occupation: [employer]   Tobacco Use    Smoking status: Never Smoker    Smokeless tobacco: Never Used   Substance and Sexual Activity    Alcohol use: No    Drug use: No    Sexual activity: Yes     Partners: Male   Other Topics Concern    Not on file   Social History Narrative    Always uses seat belt    Brushes teeth twice a day     Dental care, regularly    Exercise: Walking    Pets/Animals: Dog    No caffeine use    Recreation: [employer]    [facility]     Social Determinants of Health     Financial Resource Strain: Not on file   Food Insecurity: Not on file   Transportation Needs: Not on file   Physical Activity: Not on file   Stress: Not on file   Social Connections: Not on file   Intimate Partner Violence: Not on file   Housing Stability: Not on file       Review of Systems   Constitutional: Negative for activity change, chills, fatigue and fever  HENT: Negative for congestion  Eyes: Negative for discharge  Respiratory: Negative for cough, chest tightness and shortness of breath      Cardiovascular: Negative for chest pain, palpitations and leg swelling  Gastrointestinal: Negative for abdominal pain, blood in stool, constipation, diarrhea, nausea and vomiting  Endocrine: Negative for polydipsia, polyphagia and polyuria  Genitourinary: Negative for difficulty urinating  Musculoskeletal: Negative for arthralgias and myalgias  Skin: Negative for rash  Allergic/Immunologic: Negative for immunocompromised state  Neurological: Negative for dizziness, syncope, weakness, light-headedness and headaches  Hematological: Negative for adenopathy  Does not bruise/bleed easily  Psychiatric/Behavioral: Negative for dysphoric mood, sleep disturbance and suicidal ideas  The patient is not nervous/anxious  Objective:  Vitals:    06/27/22 0908   BP: 100/62   BP Location: Right arm   Patient Position: Sitting   Cuff Size: Adult   Pulse: 78   Resp: 16   Temp: 98 8 °F (37 1 °C)   TempSrc: Tympanic   SpO2: 97%   Weight: 61 8 kg (136 lb 3 2 oz)   Height: 5' 1" (1 549 m)     Body mass index is 25 73 kg/m²  Physical Exam  Vitals and nursing note reviewed  Constitutional:       General: She is not in acute distress  Appearance: She is well-developed  HENT:      Head: Normocephalic and atraumatic  Eyes:      Extraocular Movements: Extraocular movements intact  Pupils: Pupils are equal, round, and reactive to light  Neck:      Thyroid: No thyromegaly  Vascular: No carotid bruit or JVD  Cardiovascular:      Rate and Rhythm: Normal rate and regular rhythm  Heart sounds: Normal heart sounds  Pulmonary:      Effort: Pulmonary effort is normal  No respiratory distress  Breath sounds: Normal breath sounds  Musculoskeletal:      Cervical back: Neck supple  Right lower leg: No edema  Left lower leg: No edema  Lymphadenopathy:      Cervical: No cervical adenopathy  Skin:     General: Skin is warm and dry  Findings: No rash  Neurological:      General: No focal deficit present  Mental Status: She is alert and oriented to person, place, and time  Mental status is at baseline  Psychiatric:         Mood and Affect: Mood normal          Behavior: Behavior normal            RESULTS:    No results found for this or any previous visit (from the past 1008 hour(s))  This note was created with voice recognition software  Phonic, grammatical and spelling errors may be present within the note as a result

## 2022-10-24 RX ORDER — SERTRALINE HYDROCHLORIDE 100 MG/1
TABLET, FILM COATED ORAL
Qty: 30 TABLET | Refills: 5 | Status: SHIPPED | OUTPATIENT
Start: 2022-10-24

## 2022-11-21 RX ORDER — SERTRALINE HYDROCHLORIDE 100 MG/1
TABLET, FILM COATED ORAL
Qty: 90 TABLET | Refills: 2 | Status: SHIPPED | OUTPATIENT
Start: 2022-11-21

## 2022-12-23 ENCOUNTER — TELEPHONE (OUTPATIENT)
Dept: ADMINISTRATIVE | Facility: OTHER | Age: 42
End: 2022-12-23

## 2022-12-23 ENCOUNTER — OFFICE VISIT (OUTPATIENT)
Dept: INTERNAL MEDICINE CLINIC | Facility: CLINIC | Age: 42
End: 2022-12-23

## 2022-12-23 VITALS
BODY MASS INDEX: 25.49 KG/M2 | OXYGEN SATURATION: 98 % | DIASTOLIC BLOOD PRESSURE: 70 MMHG | HEART RATE: 87 BPM | HEIGHT: 61 IN | RESPIRATION RATE: 12 BRPM | WEIGHT: 135 LBS | SYSTOLIC BLOOD PRESSURE: 102 MMHG

## 2022-12-23 DIAGNOSIS — L30.9 ECZEMA, UNSPECIFIED TYPE: ICD-10-CM

## 2022-12-23 DIAGNOSIS — H65.02 NON-RECURRENT ACUTE SEROUS OTITIS MEDIA OF LEFT EAR: ICD-10-CM

## 2022-12-23 DIAGNOSIS — R53.83 FATIGUE, UNSPECIFIED TYPE: ICD-10-CM

## 2022-12-23 DIAGNOSIS — E78.2 MIXED HYPERLIPIDEMIA: ICD-10-CM

## 2022-12-23 RX ORDER — MULTIVITAMIN
1 CAPSULE ORAL DAILY
COMMUNITY

## 2022-12-23 NOTE — TELEPHONE ENCOUNTER
----- Message from Thuan Rivero LPN sent at 77/89/1845  1:44 PM EST -----  Regarding: Mammogram  12/22/22 1:44 PM    Hello, our patient Luna Sosa has had Mammogram completed/performed  Please assist in updating the patient chart by pulling the Care Everywhere (CE) document  The date of service is 07/06/2022       Thank you,  Thuan Rivero LPN  [facility] Nakia Diaz

## 2022-12-23 NOTE — TELEPHONE ENCOUNTER
----- Message from Yoly Stack sent at 12/23/2022  7:05 AM EST -----  12/23/22 7:05 AM    Ulises, our patient Noe Gaspar has had Mammogram and Pap Smear (HPV) aka Cervical Cancer Screening completed/performed  Please assist in updating the patient chart by pulling the Care Everywhere (CE) document  The date of service is 7/6/22 & 5/26/22       Thank you,  Yoly Barraza  INTERNAL MED Nila Simon

## 2022-12-23 NOTE — PATIENT INSTRUCTIONS
No change in current medication  Will schedule follow-up in 6 months to be annual physical with routine labs  Follow-up sooner as needed

## 2022-12-23 NOTE — PROGRESS NOTES
Assessment/Plan:   Patient Instructions   No change in current medication  Will schedule follow-up in 6 months to be annual physical with routine labs  Follow-up sooner as needed  Keep hydrated  Use moisturizing cream after shower  Can use topical steroid cream on rash areas until improved  Quality Measures:       Return in about 6 months (around 6/23/2023) for Annual physical          Diagnoses and all orders for this visit:    Post partum depression    Mixed hyperlipidemia  -     Comprehensive metabolic panel; Future  -     Lipid panel; Future    Fatigue, unspecified type  -     CBC and differential; Future    Non-recurrent acute serous otitis media of left ear    Eczema, unspecified type    Other orders  -     Multiple Vitamin (multivitamin) capsule; Take 1 capsule by mouth daily  -     Calcium Carbonate-Vit D-Min (CALCIUM 1200 PO); Take by mouth        Subjective:      Patient ID: Rupesh Pete is a 40 y o  female  Follow-up,    Overall doing well  Depression seems well controlled on sertraline  Noting some breakouts of bumpy rash on jaw area, behind ears, and on neck  It is itchy  Young daughter has similar rash  Was diagnosed with eczema  Everyone in household has been ill  Patient had been able to avoid significant symptoms  Now having some left ear congestion  No other symptoms        ALLERGIES:  Allergies   Allergen Reactions   • Penicillins Rash       CURRENT MEDICATIONS:    Current Outpatient Medications:   •  Calcium Carbonate-Vit D-Min (CALCIUM 1200 PO), Take by mouth, Disp: , Rfl:   •  Multiple Vitamin (multivitamin) capsule, Take 1 capsule by mouth daily, Disp: , Rfl:   •  sertraline (ZOLOFT) 100 mg tablet, TAKE 1 TABLET BY MOUTH EVERY DAY, Disp: 90 tablet, Rfl: 2  •  tobramycin-dexamethasone (TOBRADEX) ophthalmic ointment, 0 5 inches 2 (two) times a day, Disp: , Rfl:   •  valACYclovir (VALTREX) 500 mg tablet, Take 500 mg by mouth daily  , Disp: , Rfl:     ACTIVE PROBLEM LIST:  Patient Active Problem List   Diagnosis   • Post partum depression   • Palpitations       PAST MEDICAL HISTORY:  Past Medical History:   Diagnosis Date   • Anxiety Sep 2018   • Asthma     as a child   • Chicken pox     patient had it twice   • Eye problems     chronic herpes of R eye   • Herpes ocular    • Post partum depression    • Rectal fissure     last assessed - [id number]   • Scarlet fever     patient had when they were an infant       PAST SURGICAL HISTORY:  Past Surgical History:   Procedure Laterality Date   • CERVICAL BIOPSY  W/ LOOP ELECTRODE EXCISION     • GYNECOLOGIC CRYOSURGERY      Cervix       FAMILY HISTORY:  Family History   Problem Relation Age of Onset   • Hyperlipidemia Father    • Heart disease Maternal Grandmother         cardiac disorder   • Diabetes Maternal Grandmother    • Leukemia Maternal Grandmother    • Thyroid disease Maternal Grandfather    • COPD Paternal Grandmother    • Asthma Paternal Grandfather    • Hypertension Paternal Grandfather    • Colon cancer Other    • Other Other         Epilepsy   • Breast cancer Maternal Aunt    • GI problems Mother    • Breast cancer Paternal Aunt        SOCIAL HISTORY:  Social History     Socioeconomic History   • Marital status: /Civil Union     Spouse name: Not on file   • Number of children: Not on file   • Years of education: Not on file   • Highest education level: Not on file   Occupational History   • Occupation: [employer] Teacher - [employer]   Tobacco Use   • Smoking status: Never   • Smokeless tobacco: Never   Vaping Use   • Vaping Use: Never used   Substance and Sexual Activity   • Alcohol use: No   • Drug use: No   • Sexual activity: Yes     Partners: Male   Other Topics Concern   • Not on file   Social History Narrative    Always uses seat belt    Brushes teeth twice a day     Dental care, regularly    Exercise: Walking    Pets/Animals: Dog    No caffeine use    Recreation: [email or web address]    Teacher-[employer]     Social Determinants of Health     Financial Resource Strain: Not on file   Food Insecurity: Not on file   Transportation Needs: Not on file   Physical Activity: Not on file   Stress: Not on file   Social Connections: Not on file   Intimate Partner Violence: Not on file   Housing Stability: Not on file       Review of Systems   Constitutional: Negative for activity change, chills, fatigue and fever  HENT: Negative for congestion  Eyes: Negative for discharge  Respiratory: Negative for cough, chest tightness and shortness of breath  Cardiovascular: Negative for chest pain, palpitations and leg swelling  Gastrointestinal: Negative for abdominal pain  Endocrine: Negative for polydipsia, polyphagia and polyuria  Genitourinary: Negative for difficulty urinating  Musculoskeletal: Negative for arthralgias and myalgias  Skin: Positive for rash  Allergic/Immunologic: Negative for immunocompromised state  Neurological: Negative for dizziness, syncope, weakness, light-headedness and headaches  Hematological: Negative for adenopathy  Does not bruise/bleed easily  Psychiatric/Behavioral: Negative for dysphoric mood, sleep disturbance and suicidal ideas  The patient is not nervous/anxious  Objective:  Vitals:    12/23/22 0702   BP: 102/70   BP Location: Left arm   Patient Position: Sitting   Pulse: 87   Resp: 12   SpO2: 98%   Weight: 61 2 kg (135 lb)   Height: 5' 1" (1 549 m)     Body mass index is 25 51 kg/m²  Physical Exam  Vitals and nursing note reviewed  Constitutional:       General: She is not in acute distress  Appearance: She is well-developed  She is not ill-appearing  HENT:      Head: Normocephalic and atraumatic  Right Ear: Ear canal and external ear normal       Left Ear: Ear canal and external ear normal       Ears:      Comments: TMs both slightly dull with fluid behind  Eyes:      Extraocular Movements: Extraocular movements intact        Pupils: Pupils are equal, round, and reactive to light  Neck:      Thyroid: No thyromegaly  Vascular: No carotid bruit or JVD  Cardiovascular:      Rate and Rhythm: Normal rate and regular rhythm  Heart sounds: Normal heart sounds  Pulmonary:      Effort: Pulmonary effort is normal  No respiratory distress  Breath sounds: Normal breath sounds  Musculoskeletal:      Cervical back: Neck supple  Right lower leg: No edema  Left lower leg: No edema  Lymphadenopathy:      Cervical: No cervical adenopathy  Skin:     General: Skin is warm and dry  Findings: No rash  Comments: Eczematous type eruptions on jaw area, behind ears, neck area  Neurological:      General: No focal deficit present  Mental Status: She is alert and oriented to person, place, and time  Mental status is at baseline  Psychiatric:         Mood and Affect: Mood normal          Behavior: Behavior normal            RESULTS:    No results found for this or any previous visit (from the past 1008 hour(s))  This note was created with voice recognition software  Phonic, grammatical and spelling errors may be present within the note as a result

## 2022-12-23 NOTE — TELEPHONE ENCOUNTER
----- Message from Gustavo Michel LPN sent at 43/63/7020  1:44 PM EST -----  Regarding: Cervical Ca Screening  12/22/22 1:44 PM    Hello, our patient Silvia Troy has had Pap Smear (HPV) aka Cervical Cancer Screening completed/performed  Please assist in updating the patient chart by pulling the document from the Labs Tab  The date of service is 05/26/2022       Thank you,  Gustavo Michel LPN  [facility] Coby Szymanski

## 2022-12-28 NOTE — TELEPHONE ENCOUNTER
Upon review of the In Basket request we were able to locate, review, and update the patient chart as requested for Mammogram and Pap Smear (HPV) aka Cervical Cancer Screening  Any additional questions or concerns should be emailed to the Practice Liaisons via the appropriate education email address, please do not reply via In Basket      Thank you  Lilly Mckeon

## 2023-06-26 ENCOUNTER — TELEPHONE (OUTPATIENT)
Dept: ADMINISTRATIVE | Facility: OTHER | Age: 43
End: 2023-06-26

## 2023-06-26 NOTE — TELEPHONE ENCOUNTER
Upon review of the In Basket request we were able to note that no further action is required  The patient chart is up to date as a result of a previous request       Any additional questions or concerns should be emailed to the Practice Liaisons via the appropriate education email address, please do not reply via In Basket      Thank you  Julius Jolly MA

## 2023-06-26 NOTE — TELEPHONE ENCOUNTER
----- Message from Mario Sosa sent at 6/23/2023 12:00 PM EDT -----  06/23/23 12:00 PM    Hello, our patient Brandi Rod has had Mammogram completed/performed  Please assist in updating the patient chart by pulling the Care Everywhere (CE) document  The date of service is 7/6/22       Thank you,  Mario ALTAMIRANO [facility] AT [facility] Mariama Zaidi

## 2023-06-27 ENCOUNTER — OFFICE VISIT (OUTPATIENT)
Dept: INTERNAL MEDICINE CLINIC | Facility: CLINIC | Age: 43
End: 2023-06-27
Payer: COMMERCIAL

## 2023-06-27 VITALS
RESPIRATION RATE: 12 BRPM | DIASTOLIC BLOOD PRESSURE: 60 MMHG | SYSTOLIC BLOOD PRESSURE: 112 MMHG | HEIGHT: 61 IN | HEART RATE: 78 BPM | BODY MASS INDEX: 25.86 KG/M2 | WEIGHT: 137 LBS | OXYGEN SATURATION: 98 %

## 2023-06-27 DIAGNOSIS — E78.2 MIXED HYPERLIPIDEMIA: ICD-10-CM

## 2023-06-27 DIAGNOSIS — Z86.59 HISTORY OF ANXIETY: Primary | ICD-10-CM

## 2023-06-27 PROCEDURE — 99213 OFFICE O/P EST LOW 20 MIN: CPT | Performed by: PHYSICIAN ASSISTANT

## 2023-06-27 RX ORDER — BENZOYL PEROXIDE 50 MG/ML
LIQUID TOPICAL
COMMUNITY
Start: 2023-06-19

## 2023-06-27 RX ORDER — CLINDAMYCIN PHOSPHATE 10 MG/G
GEL TOPICAL
COMMUNITY
Start: 2023-06-19

## 2023-06-27 NOTE — PROGRESS NOTES
Assessment/Plan:      Quality Measures: Pt is not overweight, weighed with clothing on         Return in about 6 months (around 12/27/2023) for Annual physical          Diagnoses and all orders for this visit:    History of anxiety    Mixed hyperlipidemia    Other orders  -     benzoyl peroxide 5 % external wash; [facility] [id number]  -     clindamycin (CLINDAGEL) 1 % gel; USE ONCE DAILY TO ACNE AREAS ON FACE   -     Diclofenac Sodium (VOLTAREN) 1 %; APPLY 2 GRAMS TO THE AFFECTED AREA(S) BY TOPICAL ROUTE 4 TIMES PER DAY  -     triamcinolone (KENALOG) 0 1 % ointment; APPLY PEA SIZE AMOUNT TO RASH ONCE A DAY FOR TWO WEEKS, DO NOT APPLY TO FACE OR GROIN AREA  -     fluorometholone acetate (FLAREX) 0 1 % ophthalmic suspension; 1 drop in the morning          Subjective:      Patient ID: Rashmi Cantor is a 44 y o  female  Follow-up, patient did not do her labs for this visit  Overall states she feels well  Anxiety under control with sertraline 100 mg daily  Had a short episode of left lower lip numbness on 1 occasion that lasted approximately 1 minute  Has not occurred since  On topical medications for acne rosacea  She states working well  Following with dermatology  No other focal concerns  She will do her labs, I will discuss results with her when available  ALLERGIES:  Allergies   Allergen Reactions   • Penicillins Rash       CURRENT MEDICATIONS:    Current Outpatient Medications:   •  benzoyl peroxide 5 % external wash, WASH ACNE AFFECTED AREAS TWICE DAILY  , Disp: , Rfl:   •  Calcium Carbonate-Vit D-Min (CALCIUM 1200 PO), Take by mouth, Disp: , Rfl:   •  clindamycin (CLINDAGEL) 1 % gel, USE ONCE DAILY TO ACNE AREAS ON FACE , Disp: , Rfl:   •  Diclofenac Sodium (VOLTAREN) 1 %, APPLY 2 GRAMS TO THE AFFECTED AREA(S) BY TOPICAL ROUTE 4 TIMES PER DAY, Disp: , Rfl:   •  fluorometholone acetate (FLAREX) 0 1 % ophthalmic suspension, 1 drop in the morning, Disp: , Rfl:   •  Multiple Vitamin (multivitamin) capsule, Take 1 capsule by mouth daily, Disp: , Rfl:   •  sertraline (ZOLOFT) 100 mg tablet, TAKE 1 TABLET BY MOUTH EVERY DAY, Disp: 90 tablet, Rfl: 2  •  triamcinolone (KENALOG) 0 1 % ointment, APPLY PEA SIZE AMOUNT TO RASH ONCE A DAY FOR TWO WEEKS, DO NOT APPLY TO FACE OR GROIN AREA, Disp: , Rfl:   •  valACYclovir (VALTREX) 500 mg tablet, Take 500 mg by mouth daily  , Disp: , Rfl:     ACTIVE PROBLEM LIST:  Patient Active Problem List   Diagnosis   • Post partum depression   • Palpitations   • History of anxiety   • Mixed hyperlipidemia       PAST MEDICAL HISTORY:  Past Medical History:   Diagnosis Date   • Anxiety Sep 2018   • Asthma     as a child   • Chicken pox     patient had it twice   • Eye problems     chronic herpes of R eye   • Herpes ocular    • Post partum depression    • Rectal fissure     last assessed - [id number]   • Scarlet fever     patient had when they were an infant       PAST SURGICAL HISTORY:  Past Surgical History:   Procedure Laterality Date   • CERVICAL BIOPSY  W/ LOOP ELECTRODE EXCISION     • GYNECOLOGIC CRYOSURGERY      Cervix       FAMILY HISTORY:  Family History   Problem Relation Age of Onset   • Hyperlipidemia Father    • Heart disease Maternal Grandmother         cardiac disorder   • Diabetes Maternal Grandmother    • Leukemia Maternal Grandmother    • Thyroid disease Maternal Grandfather    • COPD Paternal Grandmother    • Asthma Paternal Grandfather    • Hypertension Paternal Grandfather    • Colon cancer Other    • Other Other         Epilepsy   • Breast cancer Maternal Aunt    • GI problems Mother    • Breast cancer Paternal Aunt        SOCIAL HISTORY:  Social History     Socioeconomic History   • Marital status: /Civil Union     Spouse name: Not on file   • Number of children: Not on file   • Years of education: Not on file   • Highest education level: Not on file   Occupational History   • Occupation: 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Kindergarden   Tobacco Use "  • Smoking status: Never   • Smokeless tobacco: Never   Vaping Use   • Vaping Use: Never used   Substance and Sexual Activity   • Alcohol use: No   • Drug use: No   • Sexual activity: Yes     Partners: Male   Other Topics Concern   • Not on file   Social History Narrative    Always uses seat belt    Brushes teeth twice a day     Dental care, regularly    Exercise: Walking    Pets/Animals: Dog    No caffeine use    Recreation: [facility]     Social Determinants of Health     Financial Resource Strain: Not on file   Food Insecurity: Not on file   Transportation Needs: Not on file   Physical Activity: Not on file   Stress: Not on file   Social Connections: Not on file   Intimate Partner Violence: Not on file   Housing Stability: Not on file       Review of Systems   Constitutional: Negative for activity change, chills, fatigue and fever  HENT: Negative for congestion  Eyes: Negative for discharge  Respiratory: Negative for cough, chest tightness and shortness of breath  Cardiovascular: Negative for chest pain, palpitations and leg swelling  Gastrointestinal: Negative for abdominal pain, blood in stool, constipation, diarrhea, nausea and vomiting  Endocrine: Negative for polydipsia, polyphagia and polyuria  Genitourinary: Negative for difficulty urinating  Musculoskeletal: Negative for arthralgias and myalgias  Skin: Negative for rash  Allergic/Immunologic: Negative for immunocompromised state  Neurological: Negative for dizziness, syncope, weakness, light-headedness and headaches  Hematological: Negative for adenopathy  Does not bruise/bleed easily  Psychiatric/Behavioral: Negative for dysphoric mood, sleep disturbance and suicidal ideas  The patient is not nervous/anxious            Objective:  Vitals:    06/27/23 0701   BP: 112/60   BP Location: Left arm   Patient Position: Sitting   Pulse: 78   Resp: 12   SpO2: 98%   Weight: 62 1 kg (137 lb)   Height: 5' 1\" (1 549 m) " Body mass index is 25 89 kg/m²  Physical Exam  Vitals and nursing note reviewed  Constitutional:       General: She is not in acute distress  Appearance: She is well-developed  HENT:      Head: Normocephalic and atraumatic  Eyes:      Extraocular Movements: Extraocular movements intact  Pupils: Pupils are equal, round, and reactive to light  Neck:      Thyroid: No thyromegaly  Vascular: No carotid bruit or JVD  Cardiovascular:      Rate and Rhythm: Normal rate and regular rhythm  Heart sounds: Normal heart sounds  Pulmonary:      Effort: Pulmonary effort is normal  No respiratory distress  Breath sounds: Normal breath sounds  Musculoskeletal:      Cervical back: Neck supple  Right lower leg: No edema  Left lower leg: No edema  Lymphadenopathy:      Cervical: No cervical adenopathy  Skin:     General: Skin is warm and dry  Findings: No rash  Neurological:      General: No focal deficit present  Mental Status: She is alert and oriented to person, place, and time  Mental status is at baseline  Psychiatric:         Mood and Affect: Mood normal          Behavior: Behavior normal            RESULTS:    In chart    This note was created with voice recognition software  Phonic, grammatical and spelling errors may be present within the note as a result

## 2023-06-27 NOTE — PATIENT INSTRUCTIONS
No change in current medications  Have labs done and I will contact you with results when available  We will have you schedule follow-up in 6 months, for your annual physical   After reviewing above labs I will add new labs in for that visit as necessary

## 2023-07-25 RX ORDER — SERTRALINE HYDROCHLORIDE 100 MG/1
TABLET, FILM COATED ORAL
Qty: 30 TABLET | Refills: 5 | Status: SHIPPED | OUTPATIENT
Start: 2023-07-25

## 2023-08-11 ENCOUNTER — APPOINTMENT (OUTPATIENT)
Dept: LAB | Facility: HOSPITAL | Age: 43
End: 2023-08-11
Payer: COMMERCIAL

## 2023-08-11 DIAGNOSIS — R53.83 FATIGUE, UNSPECIFIED TYPE: ICD-10-CM

## 2023-08-11 DIAGNOSIS — E78.2 MIXED HYPERLIPIDEMIA: ICD-10-CM

## 2023-08-11 LAB
ALBUMIN SERPL BCP-MCNC: 4.5 G/DL (ref 3.5–5)
ALP SERPL-CCNC: 26 U/L (ref 34–104)
ALT SERPL W P-5'-P-CCNC: 14 U/L (ref 7–52)
ANION GAP SERPL CALCULATED.3IONS-SCNC: 6 MMOL/L
AST SERPL W P-5'-P-CCNC: 18 U/L (ref 13–39)
BASOPHILS # BLD AUTO: 0.02 THOUSANDS/ÂΜL (ref 0–0.1)
BASOPHILS NFR BLD AUTO: 0 % (ref 0–1)
BILIRUB SERPL-MCNC: 0.63 MG/DL (ref 0.2–1)
BUN SERPL-MCNC: 15 MG/DL (ref 5–25)
CALCIUM SERPL-MCNC: 9.3 MG/DL (ref 8.4–10.2)
CHLORIDE SERPL-SCNC: 104 MMOL/L (ref 96–108)
CHOLEST SERPL-MCNC: 213 MG/DL
CO2 SERPL-SCNC: 27 MMOL/L (ref 21–32)
CREAT SERPL-MCNC: 0.78 MG/DL (ref 0.6–1.3)
EOSINOPHIL # BLD AUTO: 0.07 THOUSAND/ÂΜL (ref 0–0.61)
EOSINOPHIL NFR BLD AUTO: 1 % (ref 0–6)
ERYTHROCYTE [DISTWIDTH] IN BLOOD BY AUTOMATED COUNT: 11.9 % (ref 11.6–15.1)
GFR SERPL CREATININE-BSD FRML MDRD: 94 ML/MIN/1.73SQ M
GLUCOSE P FAST SERPL-MCNC: 88 MG/DL (ref 65–99)
HCT VFR BLD AUTO: 40.3 % (ref 34.8–46.1)
HDLC SERPL-MCNC: 66 MG/DL
HGB BLD-MCNC: 13.8 G/DL (ref 11.5–15.4)
IMM GRANULOCYTES # BLD AUTO: 0.02 THOUSAND/UL (ref 0–0.2)
IMM GRANULOCYTES NFR BLD AUTO: 0 % (ref 0–2)
LDLC SERPL CALC-MCNC: 135 MG/DL (ref 0–100)
LYMPHOCYTES # BLD AUTO: 1.28 THOUSANDS/ÂΜL (ref 0.6–4.47)
LYMPHOCYTES NFR BLD AUTO: 26 % (ref 14–44)
MCH RBC QN AUTO: 30.9 PG (ref 26.8–34.3)
MCHC RBC AUTO-ENTMCNC: 34.2 G/DL (ref 31.4–37.4)
MCV RBC AUTO: 90 FL (ref 82–98)
MONOCYTES # BLD AUTO: 0.56 THOUSAND/ÂΜL (ref 0.17–1.22)
MONOCYTES NFR BLD AUTO: 11 % (ref 4–12)
NEUTROPHILS # BLD AUTO: 3.06 THOUSANDS/ÂΜL (ref 1.85–7.62)
NEUTS SEG NFR BLD AUTO: 62 % (ref 43–75)
NONHDLC SERPL-MCNC: 147 MG/DL
NRBC BLD AUTO-RTO: 0 /100 WBCS
PLATELET # BLD AUTO: 216 THOUSANDS/UL (ref 149–390)
PMV BLD AUTO: 10.1 FL (ref 8.9–12.7)
POTASSIUM SERPL-SCNC: 4 MMOL/L (ref 3.5–5.3)
PROT SERPL-MCNC: 7.8 G/DL (ref 6.4–8.4)
RBC # BLD AUTO: 4.47 MILLION/UL (ref 3.81–5.12)
SODIUM SERPL-SCNC: 137 MMOL/L (ref 135–147)
TRIGL SERPL-MCNC: 61 MG/DL
WBC # BLD AUTO: 5.01 THOUSAND/UL (ref 4.31–10.16)

## 2023-08-11 PROCEDURE — 80053 COMPREHEN METABOLIC PANEL: CPT

## 2023-08-11 PROCEDURE — 80061 LIPID PANEL: CPT

## 2023-08-11 PROCEDURE — 36415 COLL VENOUS BLD VENIPUNCTURE: CPT

## 2023-08-11 PROCEDURE — 85025 COMPLETE CBC W/AUTO DIFF WBC: CPT

## 2023-09-06 RX ORDER — SERTRALINE HYDROCHLORIDE 100 MG/1
TABLET, FILM COATED ORAL
Qty: 90 TABLET | Refills: 2 | Status: SHIPPED | OUTPATIENT
Start: 2023-09-06

## 2024-04-02 NOTE — PROGRESS NOTES
Assessment/Plan:  Generaledical exam is good  Patient is to return for Tdap immunization next week  Continue follow-up with obstetrics  Followup scheduled per orders  Diagnoses and all orders for this visit:    Annual physical exam          Subjective:      Patient ID: Luann Camarena is a 41 y o  female  25-year-old white female in her 3rd trimester presents for general medical exam   She reports feeling well, no acute concerns  She has been given a prescription to have a Tdap immunization administered from her nurse midwife  Patient reports her pregnancy is going well without any concerns at this time  EMR has been reviewed, clarified and updated          ALLERGIES:  Allergies   Allergen Reactions    Penicillins Rash       CURRENT MEDICATIONS:    Current Outpatient Prescriptions:     Ganciclovir (ZIRGAN) 0 15 % GEL, Apply to eye, Disp: , Rfl:     loteprednol etabonate (LOTEMAX) 0 5 % ophthalmic suspension, Apply 1-2 drops to eye 4 (four) times a day, Disp: , Rfl:     Omega-3 Fatty Acids (FISH OIL) 645 MG CAPS, Take by mouth, Disp: , Rfl:     Prenatal Vit-Fe Fumarate-FA (PRENATAL VITAMIN) 27-0 8 MG TABS, Take by mouth, Disp: , Rfl:     ranitidine (ZANTAC 75) 75 MG tablet, Take by mouth, Disp: , Rfl:     valACYclovir (VALTREX) 500 mg tablet, Take 500 mg by mouth daily  , Disp: , Rfl:     ACTIVE PROBLEM LIST:  Patient Active Problem List   Diagnosis    Elderly multigravida in third trimester    Obesity affecting pregnancy in third trimester       PAST MEDICAL HISTORY:  Past Medical History:   Diagnosis Date    Asthma     as a child    Chicken pox     patient had it twice    Eye problems     chronic herpes of R eye    Herpes ocular     Rectal fissure     last assessed - [id number]    Scarlet fever     patient had when they were an infant       PAST SURGICAL HISTORY:  Past Surgical History:   Procedure Laterality Date    CERVICAL BIOPSY  W/ LOOP ELECTRODE EXCISION      GYNECOLOGIC CRYOSURGERY      Cervix       FAMILY HISTORY:  Family History   Problem Relation Age of Onset    Hyperlipidemia Father     Heart disease Maternal Grandmother      cardiac disorder    Diabetes Maternal Grandmother     Leukemia Maternal Grandmother     Thyroid disease Maternal Grandfather     COPD Paternal Grandmother     Asthma Paternal Grandfather     Hypertension Paternal Grandfather     Colon cancer Other     Other Other      Epilepsy    Breast cancer Maternal Aunt     GI problems Mother        SOCIAL HISTORY:  Social History     Social History    Marital status: /Civil Union     Spouse name: N/A    Number of children: N/A    Years of education: N/A     Occupational History    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Kindergarden      Social History Main Topics    Smoking status: Never Smoker    Smokeless tobacco: Never Used    Alcohol use No    Drug use: No    Sexual activity: Not on file     Other Topics Concern    Not on file     Social History Narrative    Always uses seat belt    Brushes teeth twice a day     Dental care, regularly    Exercise: Walking    Pets/Animals: Dog    No caffeine use    Recreation: [employer]s           Review of Systems   Constitutional: Negative for activity change, chills, fatigue and fever  HENT: Negative for congestion  Eyes: Negative for discharge  Respiratory: Negative for cough, chest tightness and shortness of breath  Cardiovascular: Negative for chest pain, palpitations and leg swelling  Gastrointestinal: Negative for abdominal pain, constipation and diarrhea  Endocrine: Negative for polydipsia, polyphagia and polyuria  Genitourinary: Negative for difficulty urinating and vaginal bleeding  Musculoskeletal: Negative for arthralgias and myalgias  Skin: Negative for rash  Allergic/Immunologic: Negative for immunocompromised state  Neurological: Negative for dizziness, syncope, weakness, light-headedness and headaches     Hematological: Negative for adenopathy  Does not bruise/bleed easily  Psychiatric/Behavioral: Negative for dysphoric mood  The patient is not nervous/anxious  Objective:  Vitals:    03/23/18 1549 03/23/18 1617   BP: 142/82 106/60   BP Location: Left arm Left arm   Patient Position: Sitting Sitting   Cuff Size: Large    Pulse: (!) 108    Resp: 18    SpO2: 97%    Weight: 75 2 kg (165 lb 12 8 oz)    Height: 5' 1" (1 549 m)         Physical Exam   Constitutional: She is oriented to person, place, and time  She appears well-developed and well-nourished  HENT:   Right Ear: External ear normal    Left Ear: External ear normal    Nose: Nose normal    Mouth/Throat: Oropharynx is clear and moist    Eyes: Conjunctivae and EOM are normal  Pupils are equal, round, and reactive to light  Neck: Normal range of motion  Neck supple  No JVD present  Carotid bruit is not present  No thyromegaly present  Cardiovascular: Normal rate, regular rhythm, normal heart sounds and intact distal pulses  Pulmonary/Chest: Effort normal and breath sounds normal    Abdominal: Soft  Bowel sounds are normal  There is no hepatosplenomegaly  There is no CVA tenderness  Gravid abdomen   Musculoskeletal: Normal range of motion  She exhibits no edema  Lymphadenopathy:     She has no cervical adenopathy  Neurological: She is alert and oriented to person, place, and time  She has normal reflexes  Skin: Skin is warm and dry  No rash noted  Psychiatric: She has a normal mood and affect  Her behavior is normal    Nursing note and vitals reviewed  RESULTS:    No results found for this or any previous visit (from the past 1008 hour(s))  No

## 2024-07-01 RX ORDER — SERTRALINE HYDROCHLORIDE 100 MG/1
TABLET, FILM COATED ORAL
Qty: 30 TABLET | Refills: 0 | Status: SHIPPED | OUTPATIENT
Start: 2024-07-01

## 2024-07-25 RX ORDER — SERTRALINE HYDROCHLORIDE 100 MG/1
TABLET, FILM COATED ORAL
Qty: 90 TABLET | Refills: 1 | Status: SHIPPED | OUTPATIENT
Start: 2024-07-25

## 2024-08-06 ENCOUNTER — RA CDI HCC (OUTPATIENT)
Dept: OTHER | Facility: HOSPITAL | Age: 44
End: 2024-08-06

## 2024-08-13 ENCOUNTER — OFFICE VISIT (OUTPATIENT)
Dept: INTERNAL MEDICINE CLINIC | Facility: CLINIC | Age: 44
End: 2024-08-13
Payer: COMMERCIAL

## 2024-08-13 VITALS
DIASTOLIC BLOOD PRESSURE: 68 MMHG | HEIGHT: 61 IN | TEMPERATURE: 99 F | OXYGEN SATURATION: 99 % | RESPIRATION RATE: 18 BRPM | HEART RATE: 85 BPM | WEIGHT: 141 LBS | BODY MASS INDEX: 26.62 KG/M2 | SYSTOLIC BLOOD PRESSURE: 98 MMHG

## 2024-08-13 DIAGNOSIS — E78.2 MIXED HYPERLIPIDEMIA: Primary | ICD-10-CM

## 2024-08-13 PROCEDURE — 99213 OFFICE O/P EST LOW 20 MIN: CPT | Performed by: PHYSICIAN ASSISTANT

## 2024-08-13 RX ORDER — PREDNISOLONE ACETATE 10 MG/ML
1 SUSPENSION/ DROPS OPHTHALMIC 4 TIMES DAILY
COMMUNITY

## 2024-08-13 NOTE — ASSESSMENT & PLAN NOTE
Has been on sertraline for postpartum depression but also for anxiety and stress related to work.  Work circumstances seem to improved, she is feeling much better, she would like to taper off the sertraline and see how she does.  We discussed tapering to 50 mg daily for 3 weeks, then 25 mg daily for 3 weeks then 25 mg every other day for 10 to 14 days then stop.  She will keep me informed as to how she is doing.

## 2024-08-13 NOTE — ASSESSMENT & PLAN NOTE
Currently not on any medication.  Lipid profile shows improvement from last, however total and LDL cholesterol still elevated.  Patient will work on her diet to decrease intake of processed foods, fried, fatty, greasy foods, cheese, red meat.  We will continue to monitor.

## 2024-08-13 NOTE — PATIENT INSTRUCTIONS
Medication change will be to decrease your sertraline to 50 mg daily for 3 weeks, then 25 mg daily for 3 weeks, then every other day for 10 days then stop.  Plan follow-up in 6 months for annual physical.  Labs to recheck prior to that visit.

## 2024-08-13 NOTE — PROGRESS NOTES
"Ambulatory Visit  Name: Patti Ferguson      : 1980      MRN: [id number]  Encounter Provider: Deo Menchaca PA-C  Encounter Date: 2024   Encounter department: [facility] [place]    Assessment & Plan   1. Mixed hyperlipidemia  -     Comprehensive metabolic panel; Future  -     Lipid panel; Future  2. Post partum depression  -     sertraline (ZOLOFT) 50 mg tablet; Take 1 tablet (50 mg total) by mouth daily      Depression Screening and Follow-up Plan: Patient was screened for depression during today's encounter. They screened negative with a PHQ-9 score of 4.      History of Present Illness     Follow-up, labs reviewed with patient        Review of Systems   Constitutional:  Negative for activity change, chills, fatigue and fever.   HENT:  Negative for congestion.    Eyes:  Negative for discharge.   Respiratory:  Negative for cough, chest tightness and shortness of breath.    Cardiovascular:  Negative for chest pain, palpitations and leg swelling.   Gastrointestinal:  Negative for abdominal pain, blood in stool, constipation, diarrhea, nausea and vomiting.   Endocrine: Negative for polydipsia, polyphagia and polyuria.   Genitourinary:  Negative for difficulty urinating.   Musculoskeletal:  Negative for arthralgias and myalgias.   Skin:  Negative for rash.   Allergic/Immunologic: Negative for immunocompromised state.   Neurological:  Negative for dizziness, syncope, weakness, light-headedness and headaches.   Hematological:  Negative for adenopathy. Does not bruise/bleed easily.   Psychiatric/Behavioral:  Negative for dysphoric mood, sleep disturbance and suicidal ideas. The patient is not nervous/anxious.        Objective     BP 98/68 (BP Location: Left arm, Patient Position: Sitting, Cuff Size: Standard)   Pulse 85   Temp 99 °F (37.2 °C) (Tympanic)   Resp 18   Ht 5' 1\" (1.549 m)   Wt 64 kg (141 lb)   SpO2 99%   BMI 26.64 kg/m²     Physical Exam  Constitutional:       " General: She is not in acute distress.     Appearance: Normal appearance.   HENT:      Head: Normocephalic.   Eyes:      Pupils: Pupils are equal, round, and reactive to light.   Neck:      Thyroid: No thyromegaly.      Vascular: No carotid bruit.      Trachea: Trachea normal.   Cardiovascular:      Rate and Rhythm: Normal rate and regular rhythm.      Heart sounds: No murmur heard.  Pulmonary:      Effort: Pulmonary effort is normal. No respiratory distress.      Breath sounds: Normal breath sounds.   Musculoskeletal:         General: No swelling.      Cervical back: Neck supple.      Right lower leg: No edema.      Left lower leg: No edema.   Lymphadenopathy:      Cervical: No cervical adenopathy.   Skin:     General: Skin is warm and dry.      Findings: No rash.   Neurological:      General: No focal deficit present.      Mental Status: She is alert and oriented to person, place, and time. Mental status is at baseline.   Psychiatric:         Mood and Affect: Mood normal.         Behavior: Behavior normal.       Administrative Statements

## 2025-01-18 LAB
ALBUMIN SERPL-MCNC: 4.2 G/DL (ref 3.5–5.7)
ALP SERPL-CCNC: 24 U/L (ref 35–120)
ALT SERPL-CCNC: 13 U/L
ANION GAP SERPL CALCULATED.3IONS-SCNC: 7 MMOL/L (ref 3–11)
AST SERPL-CCNC: 16 U/L
BILIRUB SERPL-MCNC: 0.6 MG/DL (ref 0.2–1)
BUN SERPL-MCNC: 16 MG/DL (ref 7–25)
CALCIUM SERPL-MCNC: 9.1 MG/DL (ref 8.5–10.5)
CHLORIDE SERPL-SCNC: 107 MMOL/L (ref 100–109)
CHOLEST SERPL-MCNC: 183 MG/DL
CHOLEST/HDLC SERPL: 3.2 {RATIO}
CO2 SERPL-SCNC: 24 MMOL/L (ref 21–31)
CREAT SERPL-MCNC: 0.67 MG/DL (ref 0.4–1.1)
CYTOLOGY CMNT CVX/VAG CYTO-IMP: ABNORMAL
GFR/BSA.PRED SERPLBLD CYS-BASED-ARV: 110 ML/MIN/{1.73_M2}
GLUCOSE SERPL-MCNC: 95 MG/DL (ref 65–99)
HDLC SERPL-MCNC: 58 MG/DL (ref 23–92)
LDLC SERPL CALC-MCNC: 113 MG/DL
NONHDLC SERPL-MCNC: 125 MG/DL
POTASSIUM SERPL-SCNC: 4 MMOL/L (ref 3.5–5.2)
PROT SERPL-MCNC: 7.2 G/DL (ref 6.3–8.3)
SODIUM SERPL-SCNC: 138 MMOL/L (ref 135–145)
TRIGL SERPL-MCNC: 60 MG/DL

## 2025-01-19 ENCOUNTER — RESULTS FOLLOW-UP (OUTPATIENT)
Dept: INTERNAL MEDICINE CLINIC | Facility: CLINIC | Age: 45
End: 2025-01-19

## 2025-02-13 ENCOUNTER — OFFICE VISIT (OUTPATIENT)
Dept: INTERNAL MEDICINE CLINIC | Facility: CLINIC | Age: 45
End: 2025-02-13
Payer: COMMERCIAL

## 2025-02-13 VITALS
SYSTOLIC BLOOD PRESSURE: 108 MMHG | HEIGHT: 61 IN | DIASTOLIC BLOOD PRESSURE: 68 MMHG | BODY MASS INDEX: 27.68 KG/M2 | WEIGHT: 146.6 LBS | OXYGEN SATURATION: 98 % | RESPIRATION RATE: 18 BRPM | HEART RATE: 84 BPM

## 2025-02-13 DIAGNOSIS — Z00.00 ANNUAL PHYSICAL EXAM: Primary | ICD-10-CM

## 2025-02-13 DIAGNOSIS — E78.2 MIXED HYPERLIPIDEMIA: ICD-10-CM

## 2025-02-13 DIAGNOSIS — H65.01 NON-RECURRENT ACUTE SEROUS OTITIS MEDIA OF RIGHT EAR: ICD-10-CM

## 2025-02-13 PROCEDURE — 99213 OFFICE O/P EST LOW 20 MIN: CPT | Performed by: PHYSICIAN ASSISTANT

## 2025-02-13 PROCEDURE — 99396 PREV VISIT EST AGE 40-64: CPT | Performed by: PHYSICIAN ASSISTANT

## 2025-02-13 RX ORDER — METHYLPREDNISOLONE 4 MG/1
TABLET ORAL
Qty: 21 TABLET | Refills: 0 | Status: SHIPPED | OUTPATIENT
Start: 2025-02-13

## 2025-02-13 NOTE — PATIENT INSTRUCTIONS
"General Medical exam is good other than right serous otitis.  Will treat patient with Medrol Dosepak prior to her seeing her ENT next week.  See whether or not this will help open up the ear.  She had already taken a course of azithromycin.  Also recommend using Flonase nasal spray 1 spray each nostril twice daily as demonstrated and instructed.  Plan to monitor lipid profile in 6 months.  Will discuss results at that time.  Plan follow-up 1 year, sooner as needed.            Patient Education     Routine physical for adults   The Basics   Written by the doctors and editors at [facility]   What is a physical? -- A physical is a routine visit, or \"check-up,\" with your doctor. You might also hear it called a \"wellness visit\" or \"preventive visit.\"  During each visit, the doctor will:   Ask about your physical and mental health   Ask about your habits, behaviors, and lifestyle   Do an exam   Give you vaccines if needed   Talk to you about any medicines you take   Give advice about your health   Answer your questions  Getting regular check-ups is an important part of taking care of your health. It can help your doctor find and treat any problems you have. But it's also important for preventing health problems.  A routine physical is different from a \"sick visit.\" A sick visit is when you see a doctor because of a health concern or problem. Since physicals are scheduled ahead of time, you can think about what you want to ask the doctor.  How often should I get a physical? -- It depends on your age and health. In general, for people age 21 years and older:   If you are younger than 50 years, you might be able to get a physical every 3 years.   If you are 50 years or older, your doctor might recommend a physical every year.  If you have an ongoing health condition, like diabetes or high blood pressure, your doctor will probably want to see you more often.  What happens during a physical? -- In general, each visit will " "include:   Physical exam - The doctor or nurse will check your height, weight, heart rate, and blood pressure. They will also look at your eyes and ears. They will ask about how you are feeling and whether you have any symptoms that bother you.   Medicines - It's a good idea to bring a list of all the medicines you take to each doctor visit. Your doctor will talk to you about your medicines and answer any questions. Tell them if you are having any side effects that bother you. You should also tell them if you are having trouble paying for any of your medicines.   Habits and behaviors - This includes:   Your diet   Your exercise habits   Whether you smoke, drink alcohol, or use drugs   Whether you are sexually active   Whether you feel safe at home  Your doctor will talk to you about things you can do to improve your health and lower your risk of health problems. They will also offer help and support. For example, if you want to quit smoking, they can give you advice and might prescribe medicines. If you want to improve your diet or get more physical activity, they can help you with this, too.   Lab tests, if needed - The tests you get will depend on your age and situation. For example, your doctor might want to check your:   Cholesterol   Blood sugar   Iron level   Vaccines - The recommended vaccines will depend on your age, health, and what vaccines you already had. Vaccines are very important because they can prevent certain serious or deadly infections.   Discussion of screening - \"Screening\" means checking for diseases or other health problems before they cause symptoms. Your doctor can recommend screening based on your age, risk, and preferences. This might include tests to check for:   Cancer, such as breast, prostate, cervical, ovarian, colorectal, prostate, lung, or skin cancer   Sexually transmitted infections, such as chlamydia and gonorrhea   Mental health conditions like depression and anxiety  Your " doctor will talk to you about the different types of screening tests. They can help you decide which screenings to have. They can also explain what the results might mean.   Answering questions - The physical is a good time to ask the doctor or nurse questions about your health. If needed, they can refer you to other doctors or specialists, too.  Adults older than 65 years often need other care, too. As you get older, your doctor will talk to you about:   How to prevent falling at home   Hearing or vision tests   Memory testing   How to take your medicines safely   Making sure that you have the help and support you need at home  All topics are updated as new evidence becomes available and our peer review process is complete.  This topic retrieved from [employer] on: May 02, 2024.  Topic 440102 Version 1.0  Release: 32.4.3 - C32.122  © 2024 Up[employer]Date, Inc. and/or its affiliates. All rights reserved.  Consumer Information Use and Disclaimer   Disclaimer: This generalized information is a limited summary of diagnosis, treatment, and/or medication information. It is not meant to be comprehensive and should be used as a tool to help the user understand and/or assess potential diagnostic and treatment options. It does NOT include all information about conditions, treatments, medications, side effects, or risks that may apply to a specific patient. It is not intended to be medical advice or a substitute for the medical advice, diagnosis, or treatment of a health care provider based on the health care provider's examination and assessment of a patient's specific and unique circumstances. Patients must speak with a health care provider for complete information about their health, medical questions, and treatment options, including any risks or benefits regarding use of medications. This information does not endorse any treatments or medications as safe, effective, or approved for treating a specific patient. Up[employer], Inc. and  its affiliates disclaim any warranty or liability relating to this information or the use thereof.The use of this information is governed by the Terms of Use, available at https://www.wolterskluwer.com/en/know/clinical-effectiveness-terms. 2024© [employer] and its affiliates and/or licensors. All rights reserved.  Copyright   © 2024 [employer] and/or its affiliates. All rights reserved.

## 2025-02-13 NOTE — PROGRESS NOTES
Adult Annual Physical  Name: Patti Ferguson      : 1980      MRN: [id number]  Encounter Provider: Deo Menchaca PA-C  Encounter Date: 2025   Encounter department: [facility] INTERNAL MEDICINE [place]    Assessment & Plan  Annual physical exam           Immunizations and preventive care screenings were discussed with patient today. Appropriate education was printed on patient's after visit summary.    Counseling:  Dental Health: discussed importance of regular tooth brushing, flossing, and dental visits.  Exercise: the importance of regular exercise/physical activity was discussed. Recommend exercise 3-5 times per week for at least 30 minutes.       Depression Screening and Follow-up Plan: Patient was screened for depression during today's encounter. They screened negative with a PHQ-9 score of 3.          History of Present Illness     Adult Annual Physical:  Patient presents for annual physical. 45-year-old female presents for her annual physical.  Overall feels well with an exception of right ear discomfort.  Cording to the patient she had been having pain, contacted her telehealth, was put on a Z-Collin.  She states this did help with the pain, but has left with decreased hearing and a swishing sensation in the ear.  She has follow-up with ENT next week.  Denies head congestion or runny nose.  No postnasal drip.  Labs reviewed with patient.  Cholesterol profile the best that it is ever been.  Now in acceptable range.  Patient has been watching her diet and using supplemental berberine.  States she is currently on a PPI due to vocal cord polyps.  ENT feels these may be secondary to silent GERD.    EMR has been reviewed, clarified, and updated with patient today..     Diet and Physical Activity:  - Diet/Nutrition: consuming 3-5 servings of fruits/vegetables daily, limited junk food and well balanced diet.  - Exercise: strength training exercises and moderate cardiovascular exercise.    Depression  Screening:    - PHQ-9 Score: 3    General Health:  - Sleep: 7-8 hours of sleep on average and sleeps well.  - Hearing: normal hearing bilateral ears.  - Vision: goes for regular eye exams and vision problems.  - Dental: regular dental visits and brushes teeth twice daily.    /GYN Health:  - Follows with GYN: yes.   - Menopause: premenopausal.   - History of STDs: no    Advanced Care Planning:  - Has an advanced directive?: no    - Has a durable medical POA?: no    - ACP document given to patient?: no      Review of Systems   Constitutional:  Negative for activity change, chills, fatigue and fever.   HENT:  Positive for ear pain. Negative for congestion, postnasal drip, rhinorrhea, sinus pressure, sinus pain, sneezing, sore throat, trouble swallowing and voice change.    Eyes:  Positive for visual disturbance (Chronic right eye problem). Negative for discharge.   Respiratory:  Negative for cough, chest tightness and shortness of breath.    Cardiovascular:  Negative for chest pain, palpitations and leg swelling.   Gastrointestinal:  Negative for abdominal pain.   Endocrine: Negative for polydipsia, polyphagia and polyuria.   Genitourinary:  Negative for difficulty urinating.   Musculoskeletal:  Negative for arthralgias and myalgias.   Skin:  Negative for rash.   Allergic/Immunologic: Negative for immunocompromised state.   Neurological:  Negative for dizziness, syncope, weakness, light-headedness and headaches.   Hematological:  Negative for adenopathy. Does not bruise/bleed easily.   Psychiatric/Behavioral:  Negative for dysphoric mood. The patient is not nervous/anxious.      Medical History Reviewed by provider this encounter:  Tobacco  Allergies  Meds  Problems  Med Hx  Surg Hx  Fam Hx     .  Current Outpatient Medications on File Prior to Visit   Medication Sig Dispense Refill    Barberry-Oreg Grape-Goldenseal (BERBERINE COMPLEX PO) Take by mouth      benzoyl peroxide 5 % external wash WASH ACNE AFFECTED  AREAS TWICE DAILY.      Calcium Carbonate-Vit D-Min (CALCIUM 1200 PO) Take by mouth      clindamycin (CLINDAGEL) 1 % gel USE ONCE DAILY TO ACNE AREAS ON FACE.      MAGNESIUM PO Take by mouth      Multiple Vitamin (multivitamin) capsule Take 1 capsule by mouth daily      omeprazole (PriLOSEC) 20 mg delayed release capsule take 1 capsule by mouth every day in the morning      prednisoLONE acetate (PRED FORTE) 1 % ophthalmic suspension Administer 1 drop to both eyes 4 (four) times a day      valACYclovir (VALTREX) 500 mg tablet Take 500 mg by mouth daily        VITAMIN E PO Take by mouth      [DISCONTINUED] sertraline (ZOLOFT) 50 mg tablet Take 1 tablet (50 mg total) by mouth daily 90 tablet 1     No current facility-administered medications on file prior to visit.      Social History     Tobacco Use    Smoking status: Never    Smokeless tobacco: Never   Vaping Use    Vaping status: Never Used   Substance and Sexual Activity    Alcohol use: No    Drug use: Never    Sexual activity: Yes     Partners: Male     Birth control/protection: Male Sterilization       Objective   There were no vitals taken for this visit.    Physical Exam  Vitals and nursing note reviewed.   Constitutional:       General: She is not in acute distress.     Appearance: She is well-developed. She is not ill-appearing.      Comments: Developed, well-nourished 45-year-old female appearing younger than stated age no acute distress.   HENT:      Head: Normocephalic and atraumatic.      Right Ear: Ear canal and external ear normal.      Left Ear: Tympanic membrane, ear canal and external ear normal.      Ears:      Comments: Right tympanic membrane is significantly distorted with retraction and distortion of light reflex.  Significant fluid behind that appears clear.  No redness.     Nose: Nose normal. No congestion.      Mouth/Throat:      Mouth: Mucous membranes are moist.      Pharynx: Oropharynx is clear. No oropharyngeal exudate.   Eyes:       Extraocular Movements: Extraocular movements intact.      Pupils: Pupils are equal, round, and reactive to light.      Comments: Injection right conjunctiva compared to left   Neck:      Thyroid: No thyromegaly.      Vascular: No carotid bruit or JVD.   Cardiovascular:      Rate and Rhythm: Normal rate and regular rhythm.      Heart sounds: Normal heart sounds. No murmur heard.  Pulmonary:      Effort: Pulmonary effort is normal.      Breath sounds: Normal breath sounds.   Chest:      Chest wall: No tenderness.   Abdominal:      General: Bowel sounds are normal.      Palpations: Abdomen is soft. There is no hepatomegaly, splenomegaly or mass.      Tenderness: There is no abdominal tenderness. There is no right CVA tenderness or left CVA tenderness.   Musculoskeletal:         General: No swelling or tenderness. Normal range of motion.      Cervical back: Normal range of motion and neck supple.      Right lower leg: No edema.      Left lower leg: No edema.   Lymphadenopathy:      Cervical: No cervical adenopathy.   Skin:     General: Skin is warm and dry.      Findings: No rash.   Neurological:      General: No focal deficit present.      Mental Status: She is alert and oriented to person, place, and time. Mental status is at baseline.      Cranial Nerves: No cranial nerve deficit.      Sensory: No sensory deficit.      Motor: No weakness.      Coordination: Coordination normal.      Gait: Gait normal.      Deep Tendon Reflexes: Reflexes normal.   Psychiatric:         Mood and Affect: Mood normal.         Behavior: Behavior normal.         Thought Content: Thought content normal.         Judgment: Judgment normal.

## 2025-03-25 ENCOUNTER — OFFICE VISIT (OUTPATIENT)
Dept: INTERNAL MEDICINE CLINIC | Facility: CLINIC | Age: 45
End: 2025-03-25
Payer: COMMERCIAL

## 2025-03-25 VITALS
RESPIRATION RATE: 17 BRPM | OXYGEN SATURATION: 99 % | SYSTOLIC BLOOD PRESSURE: 116 MMHG | DIASTOLIC BLOOD PRESSURE: 72 MMHG | HEART RATE: 88 BPM | BODY MASS INDEX: 27.38 KG/M2 | HEIGHT: 61 IN | WEIGHT: 145 LBS

## 2025-03-25 DIAGNOSIS — F43.21 SITUATIONAL DEPRESSION: Primary | ICD-10-CM

## 2025-03-25 PROCEDURE — 99213 OFFICE O/P EST LOW 20 MIN: CPT | Performed by: PHYSICIAN ASSISTANT

## 2025-03-25 NOTE — PROGRESS NOTES
Name: Patti Ferguson      : 1980      MRN: [id number]  Encounter Provider: Deo Menchaca PA-C  Encounter Date: 3/25/2025   Encounter department: [facility] INTERNAL MEDICINE [place]  :  Assessment & Plan  Situational depression      Orders:    Ambulatory Referral to Psych Services; Future           History of Present Illness   Patient presents to discuss ongoing issues related to situational to stress at work.  Patient is a  at a local school having difficulty with anxiety and depression symptoms.  Patient does have history of postpartum depression for which she was on an SSRI and had done very well.  Over the past summer she weaned off the medication, at this point would prefer not going back on medication.  We did discuss talk therapy and she is in agreement to give this a try.  Patient states she was feeling much worse last week when she made the appointment.  Still feels very stressed.  No suicidal thoughts.      Review of Systems   Constitutional:  Negative for activity change, chills, fatigue and fever.   HENT:  Negative for congestion.    Eyes:  Negative for discharge.   Respiratory:  Negative for cough, chest tightness and shortness of breath.    Cardiovascular:  Negative for chest pain, palpitations and leg swelling.   Gastrointestinal:  Negative for abdominal pain.   Genitourinary:  Negative for difficulty urinating.   Musculoskeletal:  Negative for arthralgias and myalgias.   Skin:  Negative for rash.   Allergic/Immunologic: Negative for immunocompromised state.   Neurological:  Negative for dizziness, syncope, weakness, light-headedness and headaches.   Hematological:  Negative for adenopathy. Does not bruise/bleed easily.   Psychiatric/Behavioral:  Positive for decreased concentration and dysphoric mood. Negative for agitation, behavioral problems, sleep disturbance and suicidal ideas. The patient is nervous/anxious.        Objective   /72 (BP Location: Left  "arm, Patient Position: Sitting, Cuff Size: Adult)   Pulse 88   Resp 17   Ht 5' 1\" (1.549 m)   Wt 65.8 kg (145 lb)   SpO2 99%   BMI 27.40 kg/m²      Physical Exam  Constitutional:       General: She is not in acute distress.     Appearance: Normal appearance.   HENT:      Head: Normocephalic.   Eyes:      Pupils: Pupils are equal, round, and reactive to light.   Neck:      Vascular: No carotid bruit.      Trachea: Trachea normal.   Cardiovascular:      Heart sounds: No murmur heard.  Pulmonary:      Effort: Pulmonary effort is normal. No respiratory distress.   Musculoskeletal:         General: No swelling.      Cervical back: Neck supple.      Right lower leg: No edema.      Left lower leg: No edema.   Skin:     General: Skin is warm and dry.      Findings: No rash.   Neurological:      General: No focal deficit present.      Mental Status: She is alert and oriented to person, place, and time. Mental status is at baseline.   Psychiatric:         Mood and Affect: Mood normal.         Behavior: Behavior normal.         "

## 2025-04-09 ENCOUNTER — TELEPHONE (OUTPATIENT)
Age: 45
End: 2025-04-09

## 2025-04-09 NOTE — TELEPHONE ENCOUNTER
Writer attempted to contact pt regarding referral for [facility] to verify services needed to schedule an appt. Lvm to call writer back.

## 2025-04-10 ENCOUNTER — TELEPHONE (OUTPATIENT)
Age: 45
End: 2025-04-10

## 2025-04-17 ENCOUNTER — TELEPHONE (OUTPATIENT)
Age: 45
End: 2025-04-17

## 2025-07-01 ENCOUNTER — TELEPHONE (OUTPATIENT)
Age: 45
End: 2025-07-01

## 2025-07-01 DIAGNOSIS — F43.21 SITUATIONAL DEPRESSION: Primary | ICD-10-CM

## 2025-07-01 NOTE — TELEPHONE ENCOUNTER
Patient called in to request her medication(sertraline (ZOLOFT) 50 mg tablet) to be restarted. Patient states she doesn't remember how the provider wanted to go about the medication dosage , 25mg for a week then 50mg. But she said just message her back on her to further advise.  Thank you

## 2025-07-08 ENCOUNTER — OFFICE VISIT (OUTPATIENT)
Dept: INTERNAL MEDICINE CLINIC | Facility: CLINIC | Age: 45
End: 2025-07-08
Payer: COMMERCIAL

## 2025-07-08 VITALS
BODY MASS INDEX: 27.4 KG/M2 | DIASTOLIC BLOOD PRESSURE: 80 MMHG | HEART RATE: 112 BPM | SYSTOLIC BLOOD PRESSURE: 124 MMHG | HEIGHT: 61 IN | OXYGEN SATURATION: 98 %

## 2025-07-08 DIAGNOSIS — F41.9 ANXIETY: Primary | ICD-10-CM

## 2025-07-08 PROCEDURE — 99213 OFFICE O/P EST LOW 20 MIN: CPT | Performed by: PHYSICIAN ASSISTANT

## 2025-07-08 RX ORDER — ALPRAZOLAM 0.25 MG
0.25 TABLET ORAL
Qty: 60 TABLET | Refills: 0 | Status: SHIPPED | OUTPATIENT
Start: 2025-07-08

## 2025-07-08 NOTE — PROGRESS NOTES
Name: Patti Ferguson      : 1980      MRN: [id number]  Encounter Provider: Deo Menchaca PA-C  Encounter Date: 2025   Encounter department: [facility] INTERNAL MEDICINE [place]  :  Assessment & Plan  Anxiety    Orders:    ALPRAZolam (XANAX) 0.25 mg tablet; Take 1 tablet (0.25 mg total) by mouth daily at bedtime as needed for anxiety           History of Present Illness   Acute visit    Patient well-known to me.  Has had past history of postpartum depression and depression for which in her past we had had her on sertraline with very good results.  She had contacted me 2 weeks ago about ongoing stress/depression/anxiety.  We restarted the sertraline, she just increased the dose to 50 mg, however presents today extremely anxious.  She is present with her mother.  Patient is pacing in the examining room, easily tearful, admitting that she is having intermittent panic attacks and anxious.  Upcoming work is stressful, they are having worked on her house which is stressful, they are planning to fly to [place] in 2 weeks to attend a wedding which is also stressful.  Long discussion held on treatment options.  We have agreed to short course of antianxiety agent of alprazolam.      Review of Systems   Constitutional:  Negative for activity change, chills, fatigue and fever.   HENT:  Negative for congestion.    Eyes:  Negative for discharge.   Respiratory:  Negative for cough, chest tightness and shortness of breath.    Cardiovascular:  Negative for chest pain, palpitations and leg swelling.   Gastrointestinal:  Negative for abdominal pain.   Genitourinary:  Negative for difficulty urinating.   Musculoskeletal:  Negative for arthralgias and myalgias.   Skin:  Negative for rash.   Allergic/Immunologic: Negative for immunocompromised state.   Neurological:  Negative for dizziness, syncope, weakness, light-headedness and headaches.   Hematological:  Negative for adenopathy. Does not bruise/bleed easily.  "  Psychiatric/Behavioral:  Positive for decreased concentration. Negative for dysphoric mood. The patient is nervous/anxious.        Objective   /80 (BP Location: Left arm, Patient Position: Sitting, Cuff Size: Standard)   Pulse (!) 112   Ht 5' 1\" (1.549 m)   SpO2 98%   BMI 27.40 kg/m²      Physical Exam  Constitutional:       General: She is not in acute distress.     Appearance: Normal appearance. She is not ill-appearing.      Comments: Very anxious appearing, pacing in the examining room   HENT:      Head: Normocephalic.     Eyes:      Extraocular Movements: Extraocular movements intact.      Pupils: Pupils are equal, round, and reactive to light.     Neck:      Thyroid: No thyromegaly.      Vascular: No carotid bruit.      Trachea: Trachea normal.     Cardiovascular:      Rate and Rhythm: Regular rhythm. Tachycardia present.      Heart sounds: No murmur heard.  Pulmonary:      Effort: Pulmonary effort is normal. No respiratory distress.      Breath sounds: Normal breath sounds.     Musculoskeletal:         General: No swelling.      Cervical back: Neck supple.      Right lower leg: No edema.      Left lower leg: No edema.   Lymphadenopathy:      Cervical: No cervical adenopathy.     Skin:     General: Skin is warm and dry.      Findings: No rash.     Neurological:      General: No focal deficit present.      Mental Status: She is alert and oriented to person, place, and time. Mental status is at baseline.     Psychiatric:         Mood and Affect: Mood normal.         Behavior: Behavior normal.         "

## 2025-07-08 NOTE — PATIENT INSTRUCTIONS
Continue the sertraline 50 mg 1 daily.  Can start alprazolam 0.25 mg up to twice daily if needed for anxiety/panic.  Our plan is to only use this medication for 4 to 6 weeks.  This as your other medication is kicking in.  Follow-up in 10 days, sooner as needed.

## 2025-07-17 ENCOUNTER — OFFICE VISIT (OUTPATIENT)
Dept: INTERNAL MEDICINE CLINIC | Facility: CLINIC | Age: 45
End: 2025-07-17
Payer: COMMERCIAL

## 2025-07-17 VITALS
OXYGEN SATURATION: 98 % | BODY MASS INDEX: 27.4 KG/M2 | HEIGHT: 61 IN | SYSTOLIC BLOOD PRESSURE: 112 MMHG | DIASTOLIC BLOOD PRESSURE: 66 MMHG | RESPIRATION RATE: 16 BRPM | HEART RATE: 94 BPM

## 2025-07-17 DIAGNOSIS — F41.9 ANXIETY: Primary | ICD-10-CM

## 2025-07-17 PROCEDURE — 99213 OFFICE O/P EST LOW 20 MIN: CPT | Performed by: PHYSICIAN ASSISTANT

## 2025-07-17 NOTE — PROGRESS NOTES
Name: Patti Ferguson      : 1980      MRN: [id number]  Encounter Provider: Deo Menchaca PA-C  Encounter Date: 2025   Encounter department: [facility] INTERNAL MEDICINE [place]  :  Assessment & Plan  Anxiety               Depression Screening and Follow-up Plan: Patient was screened for depression during today's encounter. They screened negative with a PHQ-2 score of 0.        History of Present Illness   1 week follow-up after starting increased dose of sertraline back to 50 mg daily which she has done well with in the past plus adding alprazolam 0.25 mg to use as needed.  According to patient since last week she states she has used approximately 4 of the alprazolam.  She is doing much better.  I think the sertraline is also starting to kick in.  She states she still has times of anxiety in the morning and tachycardia.  Overall though is feeling better.  Will be traveling to [place] for a wedding leaving next Tuesday.  Will be there until .  She will take the sertraline regularly as she is doing.  She will use the Xanax as needed.      Review of Systems   Constitutional:  Negative for activity change, chills, fatigue and fever.   HENT:  Negative for congestion.    Eyes:  Negative for discharge.   Respiratory:  Negative for cough, chest tightness and shortness of breath.    Cardiovascular:  Negative for chest pain, palpitations and leg swelling.   Gastrointestinal:  Negative for abdominal pain.   Genitourinary:  Negative for difficulty urinating.   Musculoskeletal:  Negative for arthralgias and myalgias.   Skin:  Negative for rash.   Allergic/Immunologic: Negative for immunocompromised state.   Neurological:  Negative for dizziness, syncope, weakness, light-headedness and headaches.   Hematological:  Negative for adenopathy. Does not bruise/bleed easily.   Psychiatric/Behavioral:  Negative for dysphoric mood, sleep disturbance and suicidal ideas. The patient is nervous/anxious.   "      Objective   /66 (BP Location: Left arm, Patient Position: Sitting, Cuff Size: Adult)   Pulse 94   Resp 16   Ht 5' 1\" (1.549 m)   SpO2 98%   BMI 27.40 kg/m²      Physical Exam  Constitutional:       General: She is not in acute distress.     Appearance: Normal appearance.   HENT:      Head: Normocephalic.     Eyes:      Pupils: Pupils are equal, round, and reactive to light.     Neck:      Thyroid: No thyromegaly.      Trachea: Trachea normal.     Cardiovascular:      Rate and Rhythm: Normal rate and regular rhythm.      Heart sounds: No murmur heard.  Pulmonary:      Effort: Pulmonary effort is normal. No respiratory distress.      Breath sounds: Normal breath sounds.     Musculoskeletal:         General: No swelling.      Cervical back: Neck supple.      Right lower leg: No edema.      Left lower leg: No edema.     Skin:     General: Skin is warm and dry.      Findings: No rash.     Neurological:      General: No focal deficit present.      Mental Status: She is alert and oriented to person, place, and time. Mental status is at baseline.     Psychiatric:         Mood and Affect: Mood normal.         Behavior: Behavior normal.         Thought Content: Thought content normal.         "

## 2025-07-24 DIAGNOSIS — F43.21 SITUATIONAL DEPRESSION: ICD-10-CM

## 2025-08-13 ENCOUNTER — OFFICE VISIT (OUTPATIENT)
Dept: INTERNAL MEDICINE CLINIC | Facility: CLINIC | Age: 45
End: 2025-08-13
Payer: COMMERCIAL